# Patient Record
Sex: FEMALE | Race: OTHER | NOT HISPANIC OR LATINO | URBAN - METROPOLITAN AREA
[De-identification: names, ages, dates, MRNs, and addresses within clinical notes are randomized per-mention and may not be internally consistent; named-entity substitution may affect disease eponyms.]

---

## 2023-08-30 LAB
ERYTHROCYTE DISTRIBUTION WIDTH (RATIO) BY AUTOMATED COUNT: 13.7 % (ref 11.5–14.5)
ERYTHROCYTE MEAN CORPUSCULAR HEMOGLOBIN CONCENTRATION (G/DL) BY AUTOMATED: 33.1 G/DL (ref 32–36)
ERYTHROCYTE MEAN CORPUSCULAR VOLUME (FL) BY AUTOMATED COUNT: 94 FL (ref 80–100)
ERYTHROCYTES (10*6/UL) IN BLOOD BY AUTOMATED COUNT: 4.28 X10E12/L (ref 4–5.2)
HEMATOCRIT (%) IN BLOOD BY AUTOMATED COUNT: 40.2 % (ref 36–46)
HEMOGLOBIN (G/DL) IN BLOOD: 13.3 G/DL (ref 12–16)
LEUKOCYTES (10*3/UL) IN BLOOD BY AUTOMATED COUNT: 8.9 X10E9/L (ref 4.4–11.3)
PLATELETS (10*3/UL) IN BLOOD AUTOMATED COUNT: 224 X10E9/L (ref 150–450)
REFLEX ADDED, ANEMIA PANEL: NORMAL

## 2023-08-31 LAB
ABO GROUP (TYPE) IN BLOOD: NORMAL
ANTIBODY SCREEN: NORMAL
CHLAMYDIA TRACH., AMPLIFIED: NEGATIVE
HEPATITIS B VIRUS SURFACE AG PRESENCE IN SERUM: NONREACTIVE
HEPATITIS C VIRUS AB PRESENCE IN SERUM: NONREACTIVE
HIV 1/ 2 AG/AB SCREEN: NONREACTIVE
N. GONORRHEA, AMPLIFIED: NEGATIVE
RH FACTOR: NORMAL
RUBELLA VIRUS IGG AB: POSITIVE
SYPHILIS TOTAL AB: NONREACTIVE
URINE CULTURE: NO GROWTH

## 2023-10-05 ENCOUNTER — TELEPHONE (OUTPATIENT)
Dept: OBSTETRICS AND GYNECOLOGY | Facility: CLINIC | Age: 33
End: 2023-10-05
Payer: COMMERCIAL

## 2023-10-05 DIAGNOSIS — Z3A.15 15 WEEKS GESTATION OF PREGNANCY (HHS-HCC): Primary | ICD-10-CM

## 2023-10-05 NOTE — TELEPHONE ENCOUNTER
Pt.'s  called for his wife. He stated that they are trying to get their anatomy scan scheduled. MFM said said they did not have an order. Can you please give the patient a call back when to order is placed.

## 2023-10-06 NOTE — TELEPHONE ENCOUNTER
Spoke with the patient and let her know that the MAC order was placed in her chart and she could schedule at her convenience. Patient is aware and understands.

## 2023-10-21 RX ORDER — B-COMPLEX WITH VITAMIN C
TABLET ORAL
COMMUNITY

## 2023-10-23 DIAGNOSIS — Z3A.18 18 WEEKS GESTATION OF PREGNANCY (HHS-HCC): Primary | ICD-10-CM

## 2023-10-24 ENCOUNTER — ROUTINE PRENATAL (OUTPATIENT)
Dept: OBSTETRICS AND GYNECOLOGY | Facility: CLINIC | Age: 33
End: 2023-10-24
Payer: COMMERCIAL

## 2023-10-24 VITALS — SYSTOLIC BLOOD PRESSURE: 108 MMHG | DIASTOLIC BLOOD PRESSURE: 68 MMHG | WEIGHT: 125.13 LBS

## 2023-10-24 DIAGNOSIS — Z3A.18 18 WEEKS GESTATION OF PREGNANCY (HHS-HCC): Primary | ICD-10-CM

## 2023-10-24 PROCEDURE — 0501F PRENATAL FLOW SHEET: CPT | Performed by: OBSTETRICS & GYNECOLOGY

## 2023-11-01 ENCOUNTER — ANCILLARY PROCEDURE (OUTPATIENT)
Dept: RADIOLOGY | Facility: CLINIC | Age: 33
End: 2023-11-01
Payer: COMMERCIAL

## 2023-11-01 DIAGNOSIS — Z3A.15 15 WEEKS GESTATION OF PREGNANCY (HHS-HCC): ICD-10-CM

## 2023-11-01 PROCEDURE — 76805 OB US >/= 14 WKS SNGL FETUS: CPT | Performed by: OBSTETRICS & GYNECOLOGY

## 2023-11-01 PROCEDURE — 76805 OB US >/= 14 WKS SNGL FETUS: CPT

## 2023-11-15 ENCOUNTER — ANCILLARY PROCEDURE (OUTPATIENT)
Dept: RADIOLOGY | Facility: CLINIC | Age: 33
End: 2023-11-15
Payer: COMMERCIAL

## 2023-11-15 DIAGNOSIS — Z3A.18 18 WEEKS GESTATION OF PREGNANCY (HHS-HCC): ICD-10-CM

## 2023-11-15 PROCEDURE — 76816 OB US FOLLOW-UP PER FETUS: CPT | Performed by: OBSTETRICS & GYNECOLOGY

## 2023-11-15 PROCEDURE — 76816 OB US FOLLOW-UP PER FETUS: CPT

## 2023-11-20 ENCOUNTER — APPOINTMENT (OUTPATIENT)
Dept: OBSTETRICS AND GYNECOLOGY | Facility: CLINIC | Age: 33
End: 2023-11-20
Payer: COMMERCIAL

## 2023-11-27 ENCOUNTER — ROUTINE PRENATAL (OUTPATIENT)
Dept: OBSTETRICS AND GYNECOLOGY | Facility: CLINIC | Age: 33
End: 2023-11-27
Payer: COMMERCIAL

## 2023-11-27 VITALS — DIASTOLIC BLOOD PRESSURE: 68 MMHG | WEIGHT: 134.2 LBS | SYSTOLIC BLOOD PRESSURE: 114 MMHG

## 2023-11-27 DIAGNOSIS — Z34.92 PRENATAL CARE IN SECOND TRIMESTER (HHS-HCC): ICD-10-CM

## 2023-11-27 DIAGNOSIS — Z3A.23 23 WEEKS GESTATION OF PREGNANCY (HHS-HCC): ICD-10-CM

## 2023-11-27 DIAGNOSIS — Z34.02 ENCOUNTER FOR PRENATAL CARE IN SECOND TRIMESTER OF FIRST PREGNANCY (HHS-HCC): Primary | ICD-10-CM

## 2023-11-27 PROBLEM — Z34.90 PRENATAL CARE (HHS-HCC): Status: ACTIVE | Noted: 2023-11-27

## 2023-11-27 PROCEDURE — 0501F PRENATAL FLOW SHEET: CPT

## 2023-11-27 NOTE — PROGRESS NOTES
Assessment/Plan   32 y.o.  at 23w0d  - discussed results of anatomy US; all WNL and no malformations identified  - provided with GCT supplies, instructions provided  - provided with 28 week folder and contents explained  - Routine prenatal care    Follow up in 4 weeks for next prenatal visit. GCT/CBC next visit.     CHANDNI Lin    Subjective     Noemy Ceaj is a 32 y.o.  at 23w0d with a working estimated date of delivery of 3/25/2024, by Last Menstrual Period presenting for a routine prenatal visit. She is doing well, no concerns. She denies vaginal bleeding, leakage of fluid, contractions, or decreased fetal movement.    Pregnancy Problems (from 23 to present)       Problem Noted Resolved    Prenatal care 2023 by CHANDNI Lin No    Priority:  Medium      Overview Signed 2023 11:26 AM by CHANDNI Lin     Declined flu shot                  Objective   Weight: 60.9 kg (134 lb 3.2 oz)  TW.441 kg (14 lb 3.2 oz)   Expected Total Weight Gain: Could not be calculated   Pregravid BMI: Could not be calculated  Pregravid Weight: 54.4 kg (120 lb)   BP: 114/68  Fetal Heart Rate: 152 Fundal Height (cm): 20 cm

## 2023-12-18 ENCOUNTER — ROUTINE PRENATAL (OUTPATIENT)
Dept: OBSTETRICS AND GYNECOLOGY | Facility: CLINIC | Age: 33
End: 2023-12-18
Payer: COMMERCIAL

## 2023-12-18 VITALS — DIASTOLIC BLOOD PRESSURE: 54 MMHG | WEIGHT: 138 LBS | SYSTOLIC BLOOD PRESSURE: 110 MMHG

## 2023-12-18 DIAGNOSIS — Z3A.26 26 WEEKS GESTATION OF PREGNANCY (HHS-HCC): Primary | ICD-10-CM

## 2023-12-18 DIAGNOSIS — Z13.1 SCREENING FOR DIABETES MELLITUS (DM): ICD-10-CM

## 2023-12-18 LAB
ERYTHROCYTE [DISTWIDTH] IN BLOOD BY AUTOMATED COUNT: 13.5 % (ref 11.5–14.5)
GLUCOSE 1H P 50 G GLC PO SERPL-MCNC: 103 MG/DL
HCT VFR BLD AUTO: 36.8 % (ref 36–46)
HGB BLD-MCNC: 11.9 G/DL (ref 12–16)
MCH RBC QN AUTO: 32.8 PG (ref 26–34)
MCHC RBC AUTO-ENTMCNC: 32.3 G/DL (ref 32–36)
MCV RBC AUTO: 101 FL (ref 80–100)
NRBC BLD-RTO: 0 /100 WBCS (ref 0–0)
PLATELET # BLD AUTO: 241 X10*3/UL (ref 150–450)
RBC # BLD AUTO: 3.63 X10*6/UL (ref 4–5.2)
WBC # BLD AUTO: 12 X10*3/UL (ref 4.4–11.3)

## 2023-12-18 PROCEDURE — 36415 COLL VENOUS BLD VENIPUNCTURE: CPT

## 2023-12-18 PROCEDURE — 0501F PRENATAL FLOW SHEET: CPT | Performed by: OBSTETRICS & GYNECOLOGY

## 2023-12-18 PROCEDURE — 85027 COMPLETE CBC AUTOMATED: CPT

## 2023-12-18 PROCEDURE — 82947 ASSAY GLUCOSE BLOOD QUANT: CPT

## 2023-12-18 NOTE — PROGRESS NOTES
CBC GLU  labs today.  Doing well. No complaints.   Pregnancy questions answered. Breast pump order.     TDAP next visit    Lizeth Oro MD

## 2023-12-19 LAB — REFLEX ADDED, ANEMIA PANEL: NORMAL

## 2024-01-02 ENCOUNTER — ROUTINE PRENATAL (OUTPATIENT)
Dept: OBSTETRICS AND GYNECOLOGY | Facility: CLINIC | Age: 34
End: 2024-01-02
Payer: COMMERCIAL

## 2024-01-02 VITALS — WEIGHT: 143.5 LBS | DIASTOLIC BLOOD PRESSURE: 68 MMHG | SYSTOLIC BLOOD PRESSURE: 114 MMHG | BODY MASS INDEX: 25.42 KG/M2

## 2024-01-02 DIAGNOSIS — Z3A.28 28 WEEKS GESTATION OF PREGNANCY (HHS-HCC): Primary | ICD-10-CM

## 2024-01-02 DIAGNOSIS — Z23 ENCOUNTER FOR VACCINATION: ICD-10-CM

## 2024-01-02 PROCEDURE — 90715 TDAP VACCINE 7 YRS/> IM: CPT | Performed by: OBSTETRICS & GYNECOLOGY

## 2024-01-02 PROCEDURE — 0501F PRENATAL FLOW SHEET: CPT | Performed by: OBSTETRICS & GYNECOLOGY

## 2024-01-02 PROCEDURE — 90471 IMMUNIZATION ADMIN: CPT | Performed by: OBSTETRICS & GYNECOLOGY

## 2024-01-02 NOTE — PROGRESS NOTES
TDAP done today.  Declines Flu vaccine/  is an ER resident and has tried to convince her to get it. Pt declines, also declines Covid vaccine.    Doing well. No complaints.   CBC and GLU wnl.

## 2024-01-15 ENCOUNTER — ROUTINE PRENATAL (OUTPATIENT)
Dept: OBSTETRICS AND GYNECOLOGY | Facility: CLINIC | Age: 34
End: 2024-01-15
Payer: COMMERCIAL

## 2024-01-15 VITALS — SYSTOLIC BLOOD PRESSURE: 114 MMHG | DIASTOLIC BLOOD PRESSURE: 64 MMHG | WEIGHT: 146 LBS | BODY MASS INDEX: 25.86 KG/M2

## 2024-01-15 DIAGNOSIS — Z34.93 PRENATAL CARE IN THIRD TRIMESTER (HHS-HCC): ICD-10-CM

## 2024-01-15 DIAGNOSIS — Z3A.30 30 WEEKS GESTATION OF PREGNANCY (HHS-HCC): Primary | ICD-10-CM

## 2024-01-15 PROCEDURE — 0501F PRENATAL FLOW SHEET: CPT | Performed by: OBSTETRICS & GYNECOLOGY

## 2024-01-15 NOTE — PROGRESS NOTES
Doing well. No complaints.   Looking into holistic birth . Works as acupuncturist.  Discussed options at [facility]    Lizeth Oro MD

## 2024-01-29 ENCOUNTER — APPOINTMENT (OUTPATIENT)
Dept: OBSTETRICS AND GYNECOLOGY | Facility: CLINIC | Age: 34
End: 2024-01-29
Payer: COMMERCIAL

## 2024-01-30 ENCOUNTER — APPOINTMENT (OUTPATIENT)
Dept: OBSTETRICS AND GYNECOLOGY | Facility: CLINIC | Age: 34
End: 2024-01-30
Payer: COMMERCIAL

## 2024-01-31 ENCOUNTER — APPOINTMENT (OUTPATIENT)
Dept: OBSTETRICS AND GYNECOLOGY | Facility: CLINIC | Age: 34
End: 2024-01-31
Payer: COMMERCIAL

## 2024-02-09 ENCOUNTER — ROUTINE PRENATAL (OUTPATIENT)
Dept: OBSTETRICS AND GYNECOLOGY | Facility: CLINIC | Age: 34
End: 2024-02-09
Payer: COMMERCIAL

## 2024-02-09 VITALS
WEIGHT: 149.38 LBS | DIASTOLIC BLOOD PRESSURE: 62 MMHG | BODY MASS INDEX: 26.46 KG/M2 | SYSTOLIC BLOOD PRESSURE: 118 MMHG

## 2024-02-09 DIAGNOSIS — Z3A.33 33 WEEKS GESTATION OF PREGNANCY (HHS-HCC): Primary | ICD-10-CM

## 2024-02-09 DIAGNOSIS — Z34.93 PRENATAL CARE IN THIRD TRIMESTER (HHS-HCC): ICD-10-CM

## 2024-02-09 PROCEDURE — 0501F PRENATAL FLOW SHEET: CPT

## 2024-02-09 NOTE — PROGRESS NOTES
Patient presents today for OBFU.  Patient has questions concerning the position of baby.  Patient would like to know what aides are available to her during labor.    GABRIEL LOCKHART MA    Subjective     Noemy Ceja is a 33 y.o.  at 33w4d with a working estimated date of delivery of 3/25/2024, by Last Menstrual Period who presents for a routine prenatal visit. She denies vaginal bleeding, leakage of fluid, decreased fetal movements, or contractions. Patient reports overall feeling well.       Patient with multiple questions in regards to infant growth, position,  NCB, labor and birth expectations, , and breast feeding etc.     Encouraged and discussed with patient scheduling childbirth education and breast feeding classes. Suggested downloading hypnobirthing josé miguel to help manage contractions in labor.      Her pregnancy is complicated by:  Medical Problems       Problem List       Prenatal care    Overview Signed 2023 11:26 AM by CHANDNI Lin     Declined flu shot               Objective   Weight: 67.8 kg (149 lb 6 oz)  TW.3 kg (29 lb 6 oz)  Pregravid BMI: 21.26    BP: 118/62          Prenatal Labs:  Lab Results   Component Value Date    HGB 11.9 (L) 2023    HCT 36.8 2023     2023    ABO A 2023    LABRH POS 2023    NEISSGONOAMP NEGATIVE 2023    CHLAMTRACAMP NEGATIVE 2023    SYPHT NONREACTIVE 2023    HEPBSAG NONREACTIVE 2023    HIV1X2 NONREACTIVE 2023    URINECULTURE NO GROWTH 2023       Assessment/Plan   Reviewed measures for cervical ripening. Anticipatory guidance provided on pregnancy expectations.   Follow up in 2 week for a routine prenatal visit.    CHANDNI aSlas

## 2024-02-22 ENCOUNTER — ROUTINE PRENATAL (OUTPATIENT)
Dept: OBSTETRICS AND GYNECOLOGY | Facility: CLINIC | Age: 34
End: 2024-02-22
Payer: COMMERCIAL

## 2024-02-22 ENCOUNTER — APPOINTMENT (OUTPATIENT)
Dept: OBSTETRICS AND GYNECOLOGY | Facility: CLINIC | Age: 34
End: 2024-02-22
Payer: COMMERCIAL

## 2024-02-22 VITALS — DIASTOLIC BLOOD PRESSURE: 66 MMHG | WEIGHT: 153 LBS | BODY MASS INDEX: 27.1 KG/M2 | SYSTOLIC BLOOD PRESSURE: 120 MMHG

## 2024-02-22 DIAGNOSIS — Z3A.35 35 WEEKS GESTATION OF PREGNANCY (HHS-HCC): Primary | ICD-10-CM

## 2024-02-22 PROCEDURE — 0501F PRENATAL FLOW SHEET: CPT | Performed by: OBSTETRICS & GYNECOLOGY

## 2024-02-29 ENCOUNTER — ROUTINE PRENATAL (OUTPATIENT)
Dept: OBSTETRICS AND GYNECOLOGY | Facility: CLINIC | Age: 34
End: 2024-02-29
Payer: COMMERCIAL

## 2024-02-29 VITALS — SYSTOLIC BLOOD PRESSURE: 118 MMHG | BODY MASS INDEX: 27.19 KG/M2 | WEIGHT: 153.5 LBS | DIASTOLIC BLOOD PRESSURE: 66 MMHG

## 2024-02-29 DIAGNOSIS — Z3A.36 36 WEEKS GESTATION OF PREGNANCY (HHS-HCC): ICD-10-CM

## 2024-02-29 PROCEDURE — 0501F PRENATAL FLOW SHEET: CPT | Performed by: OBSTETRICS & GYNECOLOGY

## 2024-02-29 PROCEDURE — 87081 CULTURE SCREEN ONLY: CPT

## 2024-02-29 NOTE — PROGRESS NOTES
GBS done today.    Doing well. No complaints.     Plans for hosp stay and labor preferences reviewed: NCB, breast feeding    Lizeth Oro MD

## 2024-03-03 LAB — GP B STREP GENITAL QL CULT: ABNORMAL

## 2024-03-07 ENCOUNTER — ROUTINE PRENATAL (OUTPATIENT)
Dept: OBSTETRICS AND GYNECOLOGY | Facility: CLINIC | Age: 34
End: 2024-03-07
Payer: COMMERCIAL

## 2024-03-07 VITALS
BODY MASS INDEX: 27.66 KG/M2 | DIASTOLIC BLOOD PRESSURE: 60 MMHG | SYSTOLIC BLOOD PRESSURE: 116 MMHG | WEIGHT: 156.13 LBS

## 2024-03-07 DIAGNOSIS — Z3A.37 37 WEEKS GESTATION OF PREGNANCY (HHS-HCC): Primary | ICD-10-CM

## 2024-03-07 PROCEDURE — 0501F PRENATAL FLOW SHEET: CPT | Performed by: OBSTETRICS & GYNECOLOGY

## 2024-03-12 ENCOUNTER — ROUTINE PRENATAL (OUTPATIENT)
Dept: OBSTETRICS AND GYNECOLOGY | Facility: CLINIC | Age: 34
End: 2024-03-12
Payer: COMMERCIAL

## 2024-03-12 VITALS
WEIGHT: 156.13 LBS | BODY MASS INDEX: 27.66 KG/M2 | DIASTOLIC BLOOD PRESSURE: 62 MMHG | SYSTOLIC BLOOD PRESSURE: 110 MMHG

## 2024-03-12 DIAGNOSIS — Z3A.38 38 WEEKS GESTATION OF PREGNANCY (HHS-HCC): Primary | ICD-10-CM

## 2024-03-12 PROCEDURE — 0501F PRENATAL FLOW SHEET: CPT | Performed by: OBSTETRICS & GYNECOLOGY

## 2024-03-13 ENCOUNTER — APPOINTMENT (OUTPATIENT)
Dept: OBSTETRICS AND GYNECOLOGY | Facility: CLINIC | Age: 34
End: 2024-03-13
Payer: COMMERCIAL

## 2024-03-19 ENCOUNTER — HOSPITAL ENCOUNTER (INPATIENT)
Facility: HOSPITAL | Age: 34
LOS: 3 days | Discharge: HOME | End: 2024-03-22
Attending: OBSTETRICS & GYNECOLOGY | Admitting: OBSTETRICS & GYNECOLOGY
Payer: COMMERCIAL

## 2024-03-19 ENCOUNTER — ANESTHESIA EVENT (OUTPATIENT)
Dept: OBSTETRICS AND GYNECOLOGY | Facility: HOSPITAL | Age: 34
End: 2024-03-19
Payer: COMMERCIAL

## 2024-03-19 ENCOUNTER — ANESTHESIA (OUTPATIENT)
Dept: OBSTETRICS AND GYNECOLOGY | Facility: HOSPITAL | Age: 34
End: 2024-03-19
Payer: COMMERCIAL

## 2024-03-19 DIAGNOSIS — G89.18 POST-OP PAIN: Primary | ICD-10-CM

## 2024-03-19 PROBLEM — Z37.9 NORMAL LABOR (HHS-HCC): Status: ACTIVE | Noted: 2024-03-19

## 2024-03-19 LAB
ABO GROUP (TYPE) IN BLOOD: NORMAL
ABO GROUP (TYPE) IN BLOOD: NORMAL
ALBUMIN SERPL BCP-MCNC: 3.4 G/DL (ref 3.4–5)
ALP SERPL-CCNC: 144 U/L (ref 33–110)
ALT SERPL W P-5'-P-CCNC: 10 U/L (ref 7–45)
ANION GAP SERPL CALC-SCNC: 17 MMOL/L (ref 10–20)
ANTIBODY SCREEN: NORMAL
APTT PPP: 25 SECONDS (ref 27–38)
AST SERPL W P-5'-P-CCNC: 17 U/L (ref 9–39)
BILIRUB SERPL-MCNC: 0.4 MG/DL (ref 0–1.2)
BUN SERPL-MCNC: 14 MG/DL (ref 6–23)
CALCIUM SERPL-MCNC: 9 MG/DL (ref 8.6–10.3)
CHLORIDE SERPL-SCNC: 101 MMOL/L (ref 98–107)
CO2 SERPL-SCNC: 19 MMOL/L (ref 21–32)
CREAT SERPL-MCNC: 0.88 MG/DL (ref 0.5–1.05)
EGFRCR SERPLBLD CKD-EPI 2021: 90 ML/MIN/1.73M*2
ERYTHROCYTE [DISTWIDTH] IN BLOOD BY AUTOMATED COUNT: 15.2 % (ref 11.5–14.5)
ERYTHROCYTE [DISTWIDTH] IN BLOOD BY AUTOMATED COUNT: 15.3 % (ref 11.5–14.5)
FIBRINOGEN PPP-MCNC: 466 MG/DL (ref 200–400)
GLUCOSE SERPL-MCNC: 106 MG/DL (ref 74–99)
HCT VFR BLD AUTO: 37.3 % (ref 36–46)
HCT VFR BLD AUTO: 39.4 % (ref 36–46)
HGB BLD-MCNC: 12.5 G/DL (ref 12–16)
HGB BLD-MCNC: 13 G/DL (ref 12–16)
INR PPP: 0.9 (ref 0.9–1.1)
LDH SERPL L TO P-CCNC: 142 U/L (ref 84–246)
MCH RBC QN AUTO: 31 PG (ref 26–34)
MCH RBC QN AUTO: 31.1 PG (ref 26–34)
MCHC RBC AUTO-ENTMCNC: 33 G/DL (ref 32–36)
MCHC RBC AUTO-ENTMCNC: 33.5 G/DL (ref 32–36)
MCV RBC AUTO: 93 FL (ref 80–100)
MCV RBC AUTO: 94 FL (ref 80–100)
NRBC BLD-RTO: 0 /100 WBCS (ref 0–0)
NRBC BLD-RTO: 0 /100 WBCS (ref 0–0)
PLATELET # BLD AUTO: 210 X10*3/UL (ref 150–450)
PLATELET # BLD AUTO: 238 X10*3/UL (ref 150–450)
POTASSIUM SERPL-SCNC: 3.9 MMOL/L (ref 3.5–5.3)
PROT SERPL-MCNC: 6.1 G/DL (ref 6.4–8.2)
PROTHROMBIN TIME: 10.3 SECONDS (ref 9.8–12.8)
RBC # BLD AUTO: 4.02 X10*6/UL (ref 4–5.2)
RBC # BLD AUTO: 4.2 X10*6/UL (ref 4–5.2)
RH FACTOR (ANTIGEN D): NORMAL
RH FACTOR (ANTIGEN D): NORMAL
SODIUM SERPL-SCNC: 133 MMOL/L (ref 136–145)
TREPONEMA PALLIDUM IGG+IGM AB [PRESENCE] IN SERUM OR PLASMA BY IMMUNOASSAY: NONREACTIVE
URATE SERPL-MCNC: 5.5 MG/DL (ref 2.3–6.7)
WBC # BLD AUTO: 12.1 X10*3/UL (ref 4.4–11.3)
WBC # BLD AUTO: 21.4 X10*3/UL (ref 4.4–11.3)

## 2024-03-19 PROCEDURE — 84550 ASSAY OF BLOOD/URIC ACID: CPT | Performed by: MIDWIFE

## 2024-03-19 PROCEDURE — 2500000004 HC RX 250 GENERAL PHARMACY W/ HCPCS (ALT 636 FOR OP/ED): Performed by: OBSTETRICS & GYNECOLOGY

## 2024-03-19 PROCEDURE — 85384 FIBRINOGEN ACTIVITY: CPT | Performed by: MIDWIFE

## 2024-03-19 PROCEDURE — 85610 PROTHROMBIN TIME: CPT | Performed by: MIDWIFE

## 2024-03-19 PROCEDURE — 2500000005 HC RX 250 GENERAL PHARMACY W/O HCPCS: Performed by: NURSE ANESTHETIST, CERTIFIED REGISTERED

## 2024-03-19 PROCEDURE — 7210000002 HC LABOR PER HOUR

## 2024-03-19 PROCEDURE — 01967 NEURAXL LBR ANES VAG DLVR: CPT | Performed by: NURSE ANESTHETIST, CERTIFIED REGISTERED

## 2024-03-19 PROCEDURE — 59514 CESAREAN DELIVERY ONLY: CPT | Performed by: STUDENT IN AN ORGANIZED HEALTH CARE EDUCATION/TRAINING PROGRAM

## 2024-03-19 PROCEDURE — 2500000005 HC RX 250 GENERAL PHARMACY W/O HCPCS: Performed by: OBSTETRICS & GYNECOLOGY

## 2024-03-19 PROCEDURE — 01968 ANES/ANALG CS DLVR NEURAXIAL: CPT | Performed by: NURSE ANESTHETIST, CERTIFIED REGISTERED

## 2024-03-19 PROCEDURE — 83615 LACTATE (LD) (LDH) ENZYME: CPT | Performed by: MIDWIFE

## 2024-03-19 PROCEDURE — 2500000004 HC RX 250 GENERAL PHARMACY W/ HCPCS (ALT 636 FOR OP/ED): Performed by: NURSE ANESTHETIST, CERTIFIED REGISTERED

## 2024-03-19 PROCEDURE — 2500000001 HC RX 250 WO HCPCS SELF ADMINISTERED DRUGS (ALT 637 FOR MEDICARE OP): Performed by: NURSE ANESTHETIST, CERTIFIED REGISTERED

## 2024-03-19 PROCEDURE — 36415 COLL VENOUS BLD VENIPUNCTURE: CPT | Performed by: MIDWIFE

## 2024-03-19 PROCEDURE — 86920 COMPATIBILITY TEST SPIN: CPT

## 2024-03-19 PROCEDURE — 10907ZC DRAINAGE OF AMNIOTIC FLUID, THERAPEUTIC FROM PRODUCTS OF CONCEPTION, VIA NATURAL OR ARTIFICIAL OPENING: ICD-10-PCS | Performed by: STUDENT IN AN ORGANIZED HEALTH CARE EDUCATION/TRAINING PROGRAM

## 2024-03-19 PROCEDURE — 86850 RBC ANTIBODY SCREEN: CPT | Performed by: OBSTETRICS & GYNECOLOGY

## 2024-03-19 PROCEDURE — 86780 TREPONEMA PALLIDUM: CPT | Mod: STJLAB | Performed by: OBSTETRICS & GYNECOLOGY

## 2024-03-19 PROCEDURE — 3700000014 HC AN EPIDURAL BLOCK CHARGE: Performed by: STUDENT IN AN ORGANIZED HEALTH CARE EDUCATION/TRAINING PROGRAM

## 2024-03-19 PROCEDURE — 2500000004 HC RX 250 GENERAL PHARMACY W/ HCPCS (ALT 636 FOR OP/ED): Performed by: STUDENT IN AN ORGANIZED HEALTH CARE EDUCATION/TRAINING PROGRAM

## 2024-03-19 PROCEDURE — 88307 TISSUE EXAM BY PATHOLOGIST: CPT | Performed by: PATHOLOGY

## 2024-03-19 PROCEDURE — 36415 COLL VENOUS BLD VENIPUNCTURE: CPT | Performed by: OBSTETRICS & GYNECOLOGY

## 2024-03-19 PROCEDURE — 59050 FETAL MONITOR W/REPORT: CPT

## 2024-03-19 PROCEDURE — 80053 COMPREHEN METABOLIC PANEL: CPT | Performed by: MIDWIFE

## 2024-03-19 PROCEDURE — 59510 CESAREAN DELIVERY: CPT | Performed by: STUDENT IN AN ORGANIZED HEALTH CARE EDUCATION/TRAINING PROGRAM

## 2024-03-19 PROCEDURE — 1120000001 HC OB PRIVATE ROOM DAILY

## 2024-03-19 PROCEDURE — 7100000016 HC LABOR RECOVERY PER HOUR: Performed by: STUDENT IN AN ORGANIZED HEALTH CARE EDUCATION/TRAINING PROGRAM

## 2024-03-19 PROCEDURE — 88307 TISSUE EXAM BY PATHOLOGIST: CPT | Mod: TC,STJLAB | Performed by: STUDENT IN AN ORGANIZED HEALTH CARE EDUCATION/TRAINING PROGRAM

## 2024-03-19 PROCEDURE — 3700000018 HC OB ANESTHESIA C-SECTION: Performed by: STUDENT IN AN ORGANIZED HEALTH CARE EDUCATION/TRAINING PROGRAM

## 2024-03-19 PROCEDURE — 85027 COMPLETE CBC AUTOMATED: CPT | Performed by: MIDWIFE

## 2024-03-19 PROCEDURE — 85027 COMPLETE CBC AUTOMATED: CPT | Performed by: OBSTETRICS & GYNECOLOGY

## 2024-03-19 PROCEDURE — 51701 INSERT BLADDER CATHETER: CPT

## 2024-03-19 RX ORDER — OXYTOCIN 10 [USP'U]/ML
10 INJECTION, SOLUTION INTRAMUSCULAR; INTRAVENOUS ONCE AS NEEDED
Status: DISCONTINUED | OUTPATIENT
Start: 2024-03-19 | End: 2024-03-20

## 2024-03-19 RX ORDER — METHYLERGONOVINE MALEATE 0.2 MG/ML
0.2 INJECTION INTRAVENOUS ONCE AS NEEDED
Status: DISCONTINUED | OUTPATIENT
Start: 2024-03-19 | End: 2024-03-20

## 2024-03-19 RX ORDER — DIPHENHYDRAMINE HYDROCHLORIDE 50 MG/ML
25 INJECTION INTRAMUSCULAR; INTRAVENOUS EVERY 4 HOURS PRN
Status: DISCONTINUED | OUTPATIENT
Start: 2024-03-19 | End: 2024-03-22 | Stop reason: HOSPADM

## 2024-03-19 RX ORDER — LABETALOL HYDROCHLORIDE 5 MG/ML
20 INJECTION, SOLUTION INTRAVENOUS ONCE AS NEEDED
Status: DISCONTINUED | OUTPATIENT
Start: 2024-03-19 | End: 2024-03-20

## 2024-03-19 RX ORDER — NIFEDIPINE 10 MG/1
10 CAPSULE ORAL ONCE AS NEEDED
Status: DISCONTINUED | OUTPATIENT
Start: 2024-03-19 | End: 2024-03-22 | Stop reason: HOSPADM

## 2024-03-19 RX ORDER — FENTANYL/ROPIVACAINE/NS/PF 2MCG/ML-.2
0-25 PLASTIC BAG, INJECTION (ML) INJECTION CONTINUOUS
Status: DISCONTINUED | OUTPATIENT
Start: 2024-03-19 | End: 2024-03-20

## 2024-03-19 RX ORDER — CARBOPROST TROMETHAMINE 250 UG/ML
250 INJECTION, SOLUTION INTRAMUSCULAR ONCE AS NEEDED
Status: DISCONTINUED | OUTPATIENT
Start: 2024-03-19 | End: 2024-03-20

## 2024-03-19 RX ORDER — METOCLOPRAMIDE HYDROCHLORIDE 5 MG/ML
10 INJECTION INTRAMUSCULAR; INTRAVENOUS EVERY 6 HOURS PRN
Status: DISCONTINUED | OUTPATIENT
Start: 2024-03-19 | End: 2024-03-20

## 2024-03-19 RX ORDER — MISOPROSTOL 200 UG/1
800 TABLET ORAL ONCE AS NEEDED
Status: DISCONTINUED | OUTPATIENT
Start: 2024-03-19 | End: 2024-03-20

## 2024-03-19 RX ORDER — MAGNESIUM HYDROXIDE 2400 MG/10ML
10 SUSPENSION ORAL
Status: DISCONTINUED | OUTPATIENT
Start: 2024-03-19 | End: 2024-03-22 | Stop reason: HOSPADM

## 2024-03-19 RX ORDER — METOCLOPRAMIDE 10 MG/1
10 TABLET ORAL EVERY 6 HOURS PRN
Status: DISCONTINUED | OUTPATIENT
Start: 2024-03-19 | End: 2024-03-20

## 2024-03-19 RX ORDER — FENTANYL CITRATE 50 UG/ML
INJECTION, SOLUTION INTRAMUSCULAR; INTRAVENOUS AS NEEDED
Status: DISCONTINUED | OUTPATIENT
Start: 2024-03-19 | End: 2024-03-19

## 2024-03-19 RX ORDER — TERBUTALINE SULFATE 1 MG/ML
0.25 INJECTION SUBCUTANEOUS ONCE AS NEEDED
Status: DISCONTINUED | OUTPATIENT
Start: 2024-03-19 | End: 2024-03-20

## 2024-03-19 RX ORDER — LIDOCAINE 560 MG/1
1 PATCH PERCUTANEOUS; TOPICAL; TRANSDERMAL
Status: DISCONTINUED | OUTPATIENT
Start: 2024-03-19 | End: 2024-03-22 | Stop reason: HOSPADM

## 2024-03-19 RX ORDER — PHENYLEPHRINE HCL IN 0.9% NACL 1 MG/10 ML
SYRINGE (ML) INTRAVENOUS AS NEEDED
Status: DISCONTINUED | OUTPATIENT
Start: 2024-03-19 | End: 2024-03-19

## 2024-03-19 RX ORDER — MINERAL OIL
OIL (ML) ORAL
Status: DISPENSED
Start: 2024-03-19 | End: 2024-03-20

## 2024-03-19 RX ORDER — METHYLERGONOVINE MALEATE 0.2 MG/ML
0.2 INJECTION INTRAVENOUS ONCE AS NEEDED
Status: DISCONTINUED | OUTPATIENT
Start: 2024-03-19 | End: 2024-03-22 | Stop reason: HOSPADM

## 2024-03-19 RX ORDER — SCOLOPAMINE TRANSDERMAL SYSTEM 1 MG/1
PATCH, EXTENDED RELEASE TRANSDERMAL AS NEEDED
Status: DISCONTINUED | OUTPATIENT
Start: 2024-03-19 | End: 2024-03-19

## 2024-03-19 RX ORDER — ACETAMINOPHEN 325 MG/1
975 TABLET ORAL EVERY 6 HOURS SCHEDULED
Status: DISCONTINUED | OUTPATIENT
Start: 2024-03-20 | End: 2024-03-22 | Stop reason: HOSPADM

## 2024-03-19 RX ORDER — OXYCODONE HYDROCHLORIDE 10 MG/1
10 TABLET ORAL EVERY 4 HOURS PRN
Status: DISCONTINUED | OUTPATIENT
Start: 2024-03-20 | End: 2024-03-22 | Stop reason: HOSPADM

## 2024-03-19 RX ORDER — LIDOCAINE HYDROCHLORIDE 20 MG/ML
INJECTION, SOLUTION EPIDURAL; INFILTRATION; INTRACAUDAL; PERINEURAL AS NEEDED
Status: DISCONTINUED | OUTPATIENT
Start: 2024-03-19 | End: 2024-03-19

## 2024-03-19 RX ORDER — CARBOPROST TROMETHAMINE 250 UG/ML
250 INJECTION, SOLUTION INTRAMUSCULAR ONCE AS NEEDED
Status: DISCONTINUED | OUTPATIENT
Start: 2024-03-19 | End: 2024-03-22 | Stop reason: HOSPADM

## 2024-03-19 RX ORDER — NIFEDIPINE 10 MG/1
10 CAPSULE ORAL ONCE AS NEEDED
Status: DISCONTINUED | OUTPATIENT
Start: 2024-03-19 | End: 2024-03-20

## 2024-03-19 RX ORDER — FENTANYL/ROPIVACAINE/NS/PF 2MCG/ML-.2
PLASTIC BAG, INJECTION (ML) INJECTION
Status: COMPLETED
Start: 2024-03-19 | End: 2024-03-19

## 2024-03-19 RX ORDER — LABETALOL HYDROCHLORIDE 5 MG/ML
20 INJECTION, SOLUTION INTRAVENOUS ONCE AS NEEDED
Status: DISCONTINUED | OUTPATIENT
Start: 2024-03-19 | End: 2024-03-22 | Stop reason: HOSPADM

## 2024-03-19 RX ORDER — OXYCODONE HYDROCHLORIDE 5 MG/1
5 TABLET ORAL EVERY 4 HOURS PRN
Status: DISCONTINUED | OUTPATIENT
Start: 2024-03-20 | End: 2024-03-22 | Stop reason: HOSPADM

## 2024-03-19 RX ORDER — LOPERAMIDE HYDROCHLORIDE 2 MG/1
4 CAPSULE ORAL EVERY 2 HOUR PRN
Status: DISCONTINUED | OUTPATIENT
Start: 2024-03-19 | End: 2024-03-19 | Stop reason: SDUPTHER

## 2024-03-19 RX ORDER — OXYTOCIN 10 [USP'U]/ML
10 INJECTION, SOLUTION INTRAMUSCULAR; INTRAVENOUS ONCE AS NEEDED
Status: DISCONTINUED | OUTPATIENT
Start: 2024-03-19 | End: 2024-03-22 | Stop reason: HOSPADM

## 2024-03-19 RX ORDER — POLYETHYLENE GLYCOL 3350 17 G/17G
17 POWDER, FOR SOLUTION ORAL 2 TIMES DAILY PRN
Status: DISCONTINUED | OUTPATIENT
Start: 2024-03-19 | End: 2024-03-22 | Stop reason: HOSPADM

## 2024-03-19 RX ORDER — ENOXAPARIN SODIUM 100 MG/ML
40 INJECTION SUBCUTANEOUS EVERY 24 HOURS
Status: DISCONTINUED | OUTPATIENT
Start: 2024-03-20 | End: 2024-03-22 | Stop reason: HOSPADM

## 2024-03-19 RX ORDER — PENICILLIN G 3000000 [IU]/50ML
3 INJECTION, SOLUTION INTRAVENOUS EVERY 4 HOURS
Status: DISCONTINUED | OUTPATIENT
Start: 2024-03-19 | End: 2024-03-19

## 2024-03-19 RX ORDER — DIPHENHYDRAMINE HCL 25 MG
25 TABLET ORAL EVERY 4 HOURS PRN
Status: DISCONTINUED | OUTPATIENT
Start: 2024-03-19 | End: 2024-03-22 | Stop reason: HOSPADM

## 2024-03-19 RX ORDER — HYDROMORPHONE HYDROCHLORIDE 1 MG/ML
0.2 INJECTION, SOLUTION INTRAMUSCULAR; INTRAVENOUS; SUBCUTANEOUS EVERY 5 MIN PRN
Status: DISCONTINUED | OUTPATIENT
Start: 2024-03-19 | End: 2024-03-22 | Stop reason: HOSPADM

## 2024-03-19 RX ORDER — CEFAZOLIN SODIUM 2 G/100ML
2 INJECTION, SOLUTION INTRAVENOUS ONCE
Status: COMPLETED | OUTPATIENT
Start: 2024-03-19 | End: 2024-03-19

## 2024-03-19 RX ORDER — SODIUM CHLORIDE, SODIUM LACTATE, POTASSIUM CHLORIDE, CALCIUM CHLORIDE 600; 310; 30; 20 MG/100ML; MG/100ML; MG/100ML; MG/100ML
125 INJECTION, SOLUTION INTRAVENOUS CONTINUOUS
Status: DISCONTINUED | OUTPATIENT
Start: 2024-03-19 | End: 2024-03-20

## 2024-03-19 RX ORDER — NALOXONE HYDROCHLORIDE 0.4 MG/ML
0.1 INJECTION, SOLUTION INTRAMUSCULAR; INTRAVENOUS; SUBCUTANEOUS EVERY 5 MIN PRN
Status: DISCONTINUED | OUTPATIENT
Start: 2024-03-19 | End: 2024-03-22 | Stop reason: HOSPADM

## 2024-03-19 RX ORDER — TRANEXAMIC ACID 100 MG/ML
1000 INJECTION, SOLUTION INTRAVENOUS ONCE AS NEEDED
Status: DISCONTINUED | OUTPATIENT
Start: 2024-03-19 | End: 2024-03-22 | Stop reason: HOSPADM

## 2024-03-19 RX ORDER — HYDRALAZINE HYDROCHLORIDE 20 MG/ML
5 INJECTION INTRAMUSCULAR; INTRAVENOUS ONCE AS NEEDED
Status: DISCONTINUED | OUTPATIENT
Start: 2024-03-19 | End: 2024-03-22 | Stop reason: HOSPADM

## 2024-03-19 RX ORDER — OXYTOCIN/0.9 % SODIUM CHLORIDE 30/500 ML
60 PLASTIC BAG, INJECTION (ML) INTRAVENOUS
Status: DISCONTINUED | OUTPATIENT
Start: 2024-03-19 | End: 2024-03-22 | Stop reason: HOSPADM

## 2024-03-19 RX ORDER — TRANEXAMIC ACID 100 MG/ML
1000 INJECTION, SOLUTION INTRAVENOUS ONCE AS NEEDED
Status: COMPLETED | OUTPATIENT
Start: 2024-03-19 | End: 2024-03-19

## 2024-03-19 RX ORDER — ACETAMINOPHEN 120 MG/1
SUPPOSITORY RECTAL AS NEEDED
Status: DISCONTINUED | OUTPATIENT
Start: 2024-03-19 | End: 2024-03-19

## 2024-03-19 RX ORDER — MISOPROSTOL 200 UG/1
800 TABLET ORAL ONCE AS NEEDED
Status: DISCONTINUED | OUTPATIENT
Start: 2024-03-19 | End: 2024-03-22 | Stop reason: HOSPADM

## 2024-03-19 RX ORDER — KETOROLAC TROMETHAMINE 30 MG/ML
INJECTION, SOLUTION INTRAMUSCULAR; INTRAVENOUS AS NEEDED
Status: DISCONTINUED | OUTPATIENT
Start: 2024-03-19 | End: 2024-03-19

## 2024-03-19 RX ORDER — SIMETHICONE 80 MG
80 TABLET,CHEWABLE ORAL 4 TIMES DAILY PRN
Status: DISCONTINUED | OUTPATIENT
Start: 2024-03-19 | End: 2024-03-22 | Stop reason: HOSPADM

## 2024-03-19 RX ORDER — MORPHINE SULFATE 1 MG/ML
INJECTION, SOLUTION EPIDURAL; INTRATHECAL; INTRAVENOUS AS NEEDED
Status: DISCONTINUED | OUTPATIENT
Start: 2024-03-19 | End: 2024-03-19

## 2024-03-19 RX ORDER — OXYTOCIN/0.9 % SODIUM CHLORIDE 30/500 ML
60 PLASTIC BAG, INJECTION (ML) INTRAVENOUS ONCE AS NEEDED
Status: DISCONTINUED | OUTPATIENT
Start: 2024-03-19 | End: 2024-03-20

## 2024-03-19 RX ORDER — ONDANSETRON 4 MG/1
4 TABLET, FILM COATED ORAL EVERY 6 HOURS PRN
Status: DISCONTINUED | OUTPATIENT
Start: 2024-03-19 | End: 2024-03-22 | Stop reason: HOSPADM

## 2024-03-19 RX ORDER — ONDANSETRON HYDROCHLORIDE 2 MG/ML
4 INJECTION, SOLUTION INTRAVENOUS EVERY 6 HOURS PRN
Status: DISCONTINUED | OUTPATIENT
Start: 2024-03-19 | End: 2024-03-22 | Stop reason: HOSPADM

## 2024-03-19 RX ORDER — KETOROLAC TROMETHAMINE 30 MG/ML
30 INJECTION, SOLUTION INTRAMUSCULAR; INTRAVENOUS EVERY 6 HOURS
Status: COMPLETED | OUTPATIENT
Start: 2024-03-19 | End: 2024-03-20

## 2024-03-19 RX ORDER — LOPERAMIDE HYDROCHLORIDE 2 MG/1
4 CAPSULE ORAL EVERY 2 HOUR PRN
Status: DISCONTINUED | OUTPATIENT
Start: 2024-03-19 | End: 2024-03-22 | Stop reason: HOSPADM

## 2024-03-19 RX ORDER — ONDANSETRON HYDROCHLORIDE 2 MG/ML
4 INJECTION, SOLUTION INTRAVENOUS EVERY 6 HOURS PRN
Status: DISCONTINUED | OUTPATIENT
Start: 2024-03-19 | End: 2024-03-20

## 2024-03-19 RX ORDER — LIDOCAINE HYDROCHLORIDE 10 MG/ML
30 INJECTION INFILTRATION; PERINEURAL ONCE AS NEEDED
Status: DISCONTINUED | OUTPATIENT
Start: 2024-03-19 | End: 2024-03-20

## 2024-03-19 RX ORDER — IBUPROFEN 600 MG/1
600 TABLET ORAL EVERY 6 HOURS
Status: DISCONTINUED | OUTPATIENT
Start: 2024-03-20 | End: 2024-03-22 | Stop reason: HOSPADM

## 2024-03-19 RX ORDER — ONDANSETRON 4 MG/1
4 TABLET, FILM COATED ORAL EVERY 6 HOURS PRN
Status: DISCONTINUED | OUTPATIENT
Start: 2024-03-19 | End: 2024-03-20

## 2024-03-19 RX ORDER — BISACODYL 10 MG/1
10 SUPPOSITORY RECTAL DAILY PRN
Status: DISCONTINUED | OUTPATIENT
Start: 2024-03-19 | End: 2024-03-22 | Stop reason: HOSPADM

## 2024-03-19 RX ORDER — HYDRALAZINE HYDROCHLORIDE 20 MG/ML
5 INJECTION INTRAMUSCULAR; INTRAVENOUS ONCE AS NEEDED
Status: DISCONTINUED | OUTPATIENT
Start: 2024-03-19 | End: 2024-03-20

## 2024-03-19 RX ADMIN — SODIUM CHLORIDE 5 MILLION UNITS: 900 INJECTION INTRAVENOUS at 05:47

## 2024-03-19 RX ADMIN — Medication 8 ML/HR: at 18:08

## 2024-03-19 RX ADMIN — SODIUM CHLORIDE, POTASSIUM CHLORIDE, SODIUM LACTATE AND CALCIUM CHLORIDE 125 ML/HR: 600; 310; 30; 20 INJECTION, SOLUTION INTRAVENOUS at 05:47

## 2024-03-19 RX ADMIN — PENICILLIN G 3 MILLION UNITS: 3000000 INJECTION, SOLUTION INTRAVENOUS at 08:51

## 2024-03-19 RX ADMIN — SODIUM CHLORIDE, POTASSIUM CHLORIDE, SODIUM LACTATE AND CALCIUM CHLORIDE: 600; 310; 30; 20 INJECTION, SOLUTION INTRAVENOUS at 19:07

## 2024-03-19 RX ADMIN — Medication 100 MCG: at 20:20

## 2024-03-19 RX ADMIN — LIDOCAINE HYDROCHLORIDE 60 MG: 20 INJECTION, SOLUTION EPIDURAL; INFILTRATION; INTRACAUDAL; PERINEURAL at 18:08

## 2024-03-19 RX ADMIN — SCOPOLAMINE 1 PATCH: 1.5 PATCH, EXTENDED RELEASE TRANSDERMAL at 19:51

## 2024-03-19 RX ADMIN — LIDOCAINE HYDROCHLORIDE 40 MG: 20 INJECTION, SOLUTION EPIDURAL; INFILTRATION; INTRACAUDAL; PERINEURAL at 19:33

## 2024-03-19 RX ADMIN — TRANEXAMIC ACID 1000 MG: 100 INJECTION INTRAVENOUS at 19:54

## 2024-03-19 RX ADMIN — DEXAMETHASONE SODIUM PHOSPHATE 4 MG: 4 INJECTION, SOLUTION INTRAMUSCULAR; INTRAVENOUS at 19:50

## 2024-03-19 RX ADMIN — FENTANYL CITRATE 100 MCG: 50 INJECTION, SOLUTION INTRAMUSCULAR; INTRAVENOUS at 19:18

## 2024-03-19 RX ADMIN — LIDOCAINE HYDROCHLORIDE 100 MG: 20 INJECTION, SOLUTION EPIDURAL; INFILTRATION; INTRACAUDAL; PERINEURAL at 19:01

## 2024-03-19 RX ADMIN — PENICILLIN G 3 MILLION UNITS: 3000000 INJECTION, SOLUTION INTRAVENOUS at 12:16

## 2024-03-19 RX ADMIN — MORPHINE SULFATE 2 MG: 1 INJECTION, SOLUTION EPIDURAL; INTRATHECAL; INTRAVENOUS at 20:18

## 2024-03-19 RX ADMIN — PENICILLIN G 3 MILLION UNITS: 3000000 INJECTION, SOLUTION INTRAVENOUS at 16:45

## 2024-03-19 RX ADMIN — SODIUM CHLORIDE, POTASSIUM CHLORIDE, SODIUM LACTATE AND CALCIUM CHLORIDE 125 ML/HR: 600; 310; 30; 20 INJECTION, SOLUTION INTRAVENOUS at 16:13

## 2024-03-19 RX ADMIN — ACETAMINOPHEN 650 MG: 120 SUPPOSITORY RECTAL at 20:42

## 2024-03-19 RX ADMIN — SODIUM CHLORIDE, POTASSIUM CHLORIDE, SODIUM LACTATE AND CALCIUM CHLORIDE 500 ML: 600; 310; 30; 20 INJECTION, SOLUTION INTRAVENOUS at 15:33

## 2024-03-19 RX ADMIN — ONDANSETRON 4 MG: 2 INJECTION, SOLUTION INTRAMUSCULAR; INTRAVENOUS at 19:50

## 2024-03-19 RX ADMIN — DEXTROSE MONOHYDRATE 500 MG: 50 INJECTION, SOLUTION INTRAVENOUS at 19:27

## 2024-03-19 RX ADMIN — KETOROLAC TROMETHAMINE 30 MG: 30 INJECTION, SOLUTION INTRAMUSCULAR; INTRAVENOUS at 20:17

## 2024-03-19 RX ADMIN — OXYTOCIN 600 MILLI-UNITS/MIN: 10 INJECTION, SOLUTION INTRAMUSCULAR; INTRAVENOUS at 19:45

## 2024-03-19 RX ADMIN — CEFAZOLIN SODIUM 2 G: 2 INJECTION, SOLUTION INTRAVENOUS at 19:09

## 2024-03-19 RX ADMIN — LIDOCAINE HYDROCHLORIDE 40 MG: 20 INJECTION, SOLUTION EPIDURAL; INFILTRATION; INTRACAUDAL; PERINEURAL at 18:01

## 2024-03-19 SDOH — SOCIAL STABILITY: SOCIAL INSECURITY: HAVE YOU HAD THOUGHTS OF HARMING ANYONE ELSE?: NO

## 2024-03-19 SDOH — HEALTH STABILITY: MENTAL HEALTH: CURRENT SMOKER: 0

## 2024-03-19 SDOH — HEALTH STABILITY: MENTAL HEALTH: WERE YOU ABLE TO COMPLETE ALL THE BEHAVIORAL HEALTH SCREENINGS?: YES

## 2024-03-19 SDOH — SOCIAL STABILITY: SOCIAL INSECURITY: ARE THERE ANY APPARENT SIGNS OF INJURIES/BEHAVIORS THAT COULD BE RELATED TO ABUSE/NEGLECT?: NO

## 2024-03-19 SDOH — ECONOMIC STABILITY: HOUSING INSECURITY: DO YOU FEEL UNSAFE GOING BACK TO THE PLACE WHERE YOU ARE LIVING?: NO

## 2024-03-19 SDOH — HEALTH STABILITY: MENTAL HEALTH: SUICIDAL BEHAVIOR (LIFETIME): NO

## 2024-03-19 SDOH — SOCIAL STABILITY: SOCIAL INSECURITY: PHYSICAL ABUSE: DENIES

## 2024-03-19 SDOH — SOCIAL STABILITY: SOCIAL INSECURITY: VERBAL ABUSE: DENIES

## 2024-03-19 SDOH — HEALTH STABILITY: MENTAL HEALTH: WISH TO BE DEAD (PAST 1 MONTH): NO

## 2024-03-19 SDOH — SOCIAL STABILITY: SOCIAL INSECURITY: ARE YOU OR HAVE YOU BEEN THREATENED OR ABUSED PHYSICALLY, EMOTIONALLY, OR SEXUALLY BY ANYONE?: NO

## 2024-03-19 SDOH — SOCIAL STABILITY: SOCIAL INSECURITY: DOES ANYONE TRY TO KEEP YOU FROM HAVING/CONTACTING OTHER FRIENDS OR DOING THINGS OUTSIDE YOUR HOME?: NO

## 2024-03-19 SDOH — SOCIAL STABILITY: SOCIAL INSECURITY: DO YOU FEEL ANYONE HAS EXPLOITED OR TAKEN ADVANTAGE OF YOU FINANCIALLY OR OF YOUR PERSONAL PROPERTY?: NO

## 2024-03-19 SDOH — HEALTH STABILITY: MENTAL HEALTH: NON-SPECIFIC ACTIVE SUICIDAL THOUGHTS (PAST 1 MONTH): NO

## 2024-03-19 SDOH — SOCIAL STABILITY: SOCIAL INSECURITY: HAS ANYONE EVER THREATENED TO HURT YOUR FAMILY OR YOUR PETS?: NO

## 2024-03-19 SDOH — SOCIAL STABILITY: SOCIAL INSECURITY: ABUSE SCREEN: ADULT

## 2024-03-19 ASSESSMENT — PAIN DESCRIPTION - DESCRIPTORS: DESCRIPTORS: SORE

## 2024-03-19 ASSESSMENT — LIFESTYLE VARIABLES
AUDIT-C TOTAL SCORE: 0
SKIP TO QUESTIONS 9-10: 1
HOW OFTEN DO YOU HAVE A DRINK CONTAINING ALCOHOL: NEVER
HOW MANY STANDARD DRINKS CONTAINING ALCOHOL DO YOU HAVE ON A TYPICAL DAY: PATIENT DOES NOT DRINK
HOW OFTEN DO YOU HAVE 6 OR MORE DRINKS ON ONE OCCASION: NEVER
AUDIT-C TOTAL SCORE: 0

## 2024-03-19 ASSESSMENT — ACTIVITIES OF DAILY LIVING (ADL)
GROOMING: INDEPENDENT
DRESSING YOURSELF: INDEPENDENT
ADEQUATE_TO_COMPLETE_ADL: YES
HEARING - LEFT EAR: FUNCTIONAL
LACK_OF_TRANSPORTATION: NO
BATHING: INDEPENDENT
JUDGMENT_ADEQUATE_SAFELY_COMPLETE_DAILY_ACTIVITIES: YES
WALKS IN HOME: INDEPENDENT
TOILETING: INDEPENDENT
FEEDING YOURSELF: INDEPENDENT
HEARING - RIGHT EAR: FUNCTIONAL
PATIENT'S MEMORY ADEQUATE TO SAFELY COMPLETE DAILY ACTIVITIES?: YES

## 2024-03-19 ASSESSMENT — PAIN SCALES - GENERAL
PAINLEVEL_OUTOF10: 0 - NO PAIN
PAINLEVEL_OUTOF10: 4
PAINLEVEL_OUTOF10: 3
PAINLEVEL_OUTOF10: 8
PAINLEVEL_OUTOF10: 5 - MODERATE PAIN
PAINLEVEL_OUTOF10: 7
PAINLEVEL_OUTOF10: 10 - WORST POSSIBLE PAIN
PAINLEVEL_OUTOF10: 5 - MODERATE PAIN
PAINLEVEL_OUTOF10: 4
PAINLEVEL_OUTOF10: 0 - NO PAIN
PAINLEVEL_OUTOF10: 3
PAINLEVEL_OUTOF10: 4
PAINLEVEL_OUTOF10: 4
PAINLEVEL_OUTOF10: 10 - WORST POSSIBLE PAIN
PAINLEVEL_OUTOF10: 4
PAINLEVEL_OUTOF10: 6
PAINLEVEL_OUTOF10: 7
PAINLEVEL_OUTOF10: 3
PAINLEVEL_OUTOF10: 8
PAINLEVEL_OUTOF10: 5 - MODERATE PAIN
PAINLEVEL_OUTOF10: 4
PAINLEVEL_OUTOF10: 8
PAINLEVEL_OUTOF10: 4
PAINLEVEL_OUTOF10: 5 - MODERATE PAIN
PAINLEVEL_OUTOF10: 7
PAINLEVEL_OUTOF10: 6

## 2024-03-19 ASSESSMENT — PATIENT HEALTH QUESTIONNAIRE - PHQ9
SUM OF ALL RESPONSES TO PHQ9 QUESTIONS 1 & 2: 0
2. FEELING DOWN, DEPRESSED OR HOPELESS: NOT AT ALL
1. LITTLE INTEREST OR PLEASURE IN DOING THINGS: NOT AT ALL

## 2024-03-19 NOTE — PROGRESS NOTES
S: Sitting on birthing ball, breathing through contractions. Support at bedside. Requests a cervical exam at this time.    O: SVE: 8/100/-1      Spontaneously ruptured for clear fluid upon cervical exam      Agata q2-4 per palpation      FHR reassured @136 per IFM with SROM    A: 32yo G1 at 39.1 per LMP c/w 19 week US      GBS+      Active labor      Normotensive    P: Encouraged maternal positioning for labor/comfort      Peanut ball       Discussed breathing, relaxation      Reassurance provided      Room prepared for delivery      Questions answered      Tub filled per patient request. Discussed strict rules for laboring in tub only. Patient notified she needs to alert staff the moment she feels rectal pressure/urge to push. Patient and partner verbalize agreement      Anticipate

## 2024-03-19 NOTE — HOSPITAL COURSE
History of Present Illness  Noemy Ceja is a 33 y.o.  at 39w1d. JENNIE: 3/25/2024, by Last Menstrual Period. Estimated fetal weight: 7-0. She has had prenatal care with Dr. Oro .     Chief Complaint: Contractions    0511: Admit, /-2, BBOW  0547: PCN #1  1122: 8/100/-1, SROM, clear  1354: 9/100/-1

## 2024-03-19 NOTE — H&P
Obstetrical Admission History and Physical     Noemy Ceja is a 33 y.o.  at 39w1d. JENNIE: 3/25/2024, by Last Menstrual Period. Estimated fetal weight: 7-0. She has had prenatal care with Dr. Oro .    Chief Complaint: Contractions    Assessment/Plan    Active Labor - desires HBC - Will start PCN for GBS    Principal Problem:    Normal labor      Pregnancy Problems (from 23 to present)       Problem Noted Resolved    Prenatal care 2023 by CHANDNI Lin No    Priority:  Medium      Overview Signed 2023 11:26 AM by CHANDNI Lin     Declined flu shot               Options for delivery have been discussed with the patient and she elects a vaginal delivery.  Labor has been discussed in detail. The risks, benefits, complications, alternatives, expected outcomes, potential problems during recuperation and recovery, and the risks of not performing the procedure were discussed with the patient. The patient stated understanding that the risks of delivery include, but are not limited to: death; reaction to medications; injury to bowel, bladder, ureters, uterus, cervix, vagina, and other pelvic and abdominal structures, infection; blood loss and possible need for transfusion; and potential need for surgery, including hysterectomy. The risks of injury to the infant during delivery were also discussed. All questions were answered. There was concurrence with the planned procedure, and the patient wanted to proceed.    Admit to inpatient status. I anticipate that this patient will require a stay exceeding at least 2 midnights for delivery and postpartum care.  Active management of labor.  Management of pregnancy complications, as indicated.    Subjective   Noemy is here complaining of q5 min contractions. Good fetal movement. C/O bloody show., Denies leaking of fluid.          Obstetrical History   OB History    Para Term  AB Living   1 0 0 0 0 0   SAB IAB Ectopic  "Multiple Live Births   0 0 0 0 0      # Outcome Date GA Lbr Emmanuel/2nd Weight Sex Delivery Anes PTL Lv   1 Current                Past Medical History  History reviewed. No pertinent past medical history.     Past Surgical History   History reviewed. No pertinent surgical history.    Social History  Social History     Tobacco Use    Smoking status: Never    Smokeless tobacco: Never   Substance Use Topics    Alcohol use: Not Currently     Substance and Sexual Activity   Drug Use Never       Allergies  Patient has no known allergies.     Medications  Medications Prior to Admission   Medication Sig Dispense Refill Last Dose    omega-3/dha/epa/fish oil (OMEGA-3 FISH OIL ORAL) Take by mouth.       prenatal vitamin, iron-folic, (Prenatal Vitamin) 27 mg iron-800 mcg folic acid tablet           Objective    Last Vitals  Temp Pulse Resp BP MAP O2 Sat   36.9 °C (98.4 °F) 81 18 125/75   96 %     Physical Examination  GENERAL: Examination reveals a well developed, well nourished, gravid female in no acute distress. She is alert and cooperative.  LUNGS:  Normal effort  HEART:  Normal rate  FHR is  , with Accelerations, and a Category I tracing.    VAGINA: normal appearing vagina with normal color and discharge and no lesions noted  CERVIX: 4 cm dilated, 90 % effaced, -2 station; MEMBRANES are  (Bulging Bag)  EXTREMITIES: no redness or tenderness in the calves or thighs, no edema  SKIN: normal coloration and turgor, no rashes  NEUROLOGICAL: alert, oriented, normal speech, no focal findings or movement disorder noted  PSYCHOLOGICAL: awake and alert; oriented to person, place, and time    Lab Review  No results found for: \"WBC\", \"HGB\", \"HCT\", \"PLT\"    "

## 2024-03-19 NOTE — PROGRESS NOTES
Patient now comfortable s/p epidural. Encouraged to start pushing again. Fatigued and requesting time to rest. Discussed concern for protracted second stage (now complete x3.5 hours though has only been pushed for a total of 2.5 hours). Agreeable to resume pushing after additional five minutes of rest. Will reassess fetal position and pushing efforts then with low threshold for OVD vs CD. Fetal status reassuring with category 1 tracing. Ctx q2 min spontaneously. HELLP labs sent for mild range BPs during epidural. CBC notable for WBC 21.4. Was tachycardic while pushing NCB and during epidural but now HR in the low 100s. Not currently meeting criteria for sepsis. Coags and fibrinogen also sent given bleeding at last exam felt to be somewhat greater than bloody show. Still pending at this time. T&C x1U pRBCs. To reassess in 5 minutes    Lisbeth Bush MD

## 2024-03-19 NOTE — PROGRESS NOTES
To bedside for 7 minute prolonged decel to the 90s at start of second stage. Per CNM, currently 0 station. Repositioned and encouraged to breathe through contractions with recovery back to baseline with moderate variability. Tracing remained category 1 and patient was allowed to resume pushing efforts. Tracing continues to remain category 1. CRNA also to bedside to discuss options for regional analgesia. Discussed indications for  including fetal intolerance of pushing as well as possible need for GETA in the setting of urgent . As tracing has since recovered, no indication for  at this time. Patient declines epidural at this time. To continue pushing efforts with plan for CEFM for duration of labor.     Lisbeth Bush MD

## 2024-03-19 NOTE — ANESTHESIA PREPROCEDURE EVALUATION
"Patient: Noemy Ceja    Evaluation Method: In-person visit    Procedure Information    Date: 24  Procedure: Labor Analgesia        39w1d 33 y.o.  attempted NCB but would like and epidural.    /72   Pulse (!) 113   Temp 37.2 °C (99 °F) (Oral)   Resp 20   Ht 1.588 m (5' 2.5\")   Wt 71.6 kg (157 lb 15.4 oz)   LMP 2023   SpO2 96%   BMI 28.43 kg/m²       Relevant Problems   No relevant active problems       Clinical information reviewed:   Tobacco  Allergies    Med Hx  Surg Hx   Fam Hx          NPO Detail:  No data recorded     OB/Gyn Evaluation    Present Pregnancy    Patient is pregnant now.   Obstetric History                Physical Exam    Airway  Mallampati: IV  TM distance: >3 FB  Neck ROM: full     Cardiovascular   Rhythm: regular  Rate: normal     Dental    Pulmonary   Breath sounds clear to auscultation     Abdominal     Comments: gravid           Anesthesia Plan    History of general anesthesia?: yes  History of complications of general anesthesia?: no    ASA 2     epidural   (Patient has no history of problems with anesthesia  Patient has no family history of problems with anesthesia     I informed and discussed the risks and benefits of general, spinal and epidural anesthesia with the patient.  The patient expressed her understanding and her questions were answered.  A verbal consent was given by the patient.  )  The patient is not a current smoker.    Anesthetic plan and risks discussed with patient.  Use of blood products discussed with patient who consented to blood products.        "

## 2024-03-19 NOTE — PROGRESS NOTES
Care assumed for NCB in room 7 and physician has another labor patient.    S: Breathing through contractions,  supportive at bedside. Taking breakfast without difficulties. Denies rectal/vaginal pain/pressure. Denies LOF.    O: SVE: Deferred      [place]: Agata q2-4 per palpation and patient report, as they are self timing      FHR: Reassuring per intermittent dopple @145    A: 32yo G1 at 39.1 per LMP c/w 19 week       GBS+      Latent labor      Normotensive    P: Reviewed labs/vitals      Discussed with patient position changes, hydrotherapy, AROM. Support provided and patient very pleased with and very pleased with and agreeable to midwifery management at this time.     IFM     GBS prophylaxis per guidelines --> Second dose due 0947     Plan at this time is for patient to finish her breakfast and mentally prepare for the day per her request. After 1-2 hour, will work very hands-on with patient with position changes, shower. Will reassess cervix for change when appropriate per patient request or per need for change in POC and once adequate GBS Prophylaxis assured      Questions answered, patient verbalizes understanding and agrees with POC      Dr. Bush aware of patient status and POC       Anticipate

## 2024-03-19 NOTE — NURSING NOTE
1235 Patient expressed feeling desire to bear down. AUBREY Brooks and RN present. RN continuously at bedside.   1354 Cervical exam by provider at this time, patient repositioned into throne.   1412 Rn at bedside assessing vitals, patient contacting and rocking with arms bent. Assisted with not being in motion during blood pressure check. Blood pressures discussed with AUBREY Brooks, no new orders at this time.   1433 RN and AUBREY Brooks, BP rechecked. See flow sheet. No new orders at this time.

## 2024-03-19 NOTE — NURSING NOTE
Per MD Colon patient okay to come off monitor. Continue intermittent monitoring. Patient to get IV at 0520 to get PCN prophylaxis for GBS

## 2024-03-19 NOTE — PROGRESS NOTES
Patient now agreeable to resume pushing. ARPIT on exam with vertex at +2 and significant caput. EFW 7 lb by [place]. Minimal descent with first push and then proceeded to have decel to 80s x2 minutes. Recovered to baseline with moderate variability with position changes. At this point, recommend either OVD or pLTCS for arrest of descent. Discussed r/b/a to each at length. Reviewed  risk of OVD including scalp edema/laceration as well as rare risk of life threatening brain bleed. Also discussed possibility of pulling into a shoulder dystocia and the risks associated with shoulder dystocia. Reviewed maternal risks of OVD including OASIS. Finally, reviewed recommendation for c/s should VAVD fail. Also discussed risks of  including but not limited to bleeding, infection, and damage to surrounding organs. After time to discuss, patient and partner elect to proceed with pLTCS. Ancef/azithro on call to OR. Already crossed for 1U pRBCs. Will avoid methergine given mild range BPs. All questions answered, agreeable to proceed    Lisbeth Bush MD

## 2024-03-19 NOTE — CARE PLAN
The patient's goals for the shift include Follow birth plan    The clinical goals for the shift include Reassuring FHR      Problem: Vaginal Birth or  Section  Goal: Fetal and maternal status remain reassuring during the birth process  Outcome: Progressing  Flowsheets (Taken 3/19/2024 0704)  Fetal and maternal status remain reassuring during the birth process:   Monitor vital signs   Monitor fetal heart rate   Monitor uterine activity  Goal: Demonstrates labor coping techniques through delivery  Outcome: Progressing  Flowsheets (Taken 3/19/2024 0704)  Demonstrates labor coping techniques through delivery:   Positioning, turning, and/or use of birthing ball assistance   Breathing/relaxation assistance  Goal: Minimal s/sx of HDP and BP<160/110  Outcome: Progressing  Flowsheets (Taken 3/19/2024 0704)  Minimal s/sx of HDP and BP <160/110:   Med administration/monitoring of effect   Monitor QBL and vital signs     Problem: Safety - Adult  Goal: Free from fall injury  Outcome: Progressing  Flowsheets (Taken 3/19/2024 0704)  Free from fall injury: Instruct family/caregiver on patient safety

## 2024-03-19 NOTE — PROGRESS NOTES
Continued position changes.  Encouraged to breathe through contraction and allow for passive descent.  Pitocin augmentation discussed, offered to help with the final stages of labor and to bring contraction closer than 4min. Patient would like to consider.

## 2024-03-19 NOTE — PROGRESS NOTES
Patient reports feeling urge and pressure with contractions.    SVE: 9/100/-2  Repositioned to high fowlers with foot of bed dropped  Discussed breathing techniques, support given  Family at bedside

## 2024-03-19 NOTE — PROGRESS NOTES
Patient reported feeling increase rectal urge with contractions and reflexive pushing. Strongly requests to push.0    SVE: 10/100/0    Pushing explained and patient coached. With second effective push, 205cc blood loss noted on pad and 7min long FHR deceleration noted. Pilar Winchester CRNA to bedside for evaluation.    Deceleration resolved with position changes and holding pushes for 5+ min.     Pushing resumed with excellent maternal efforts.    Anticipate

## 2024-03-19 NOTE — PROGRESS NOTES
Complete x2.5 hours, pushing x2 hours. Started at 0 station, now +1 with caput to +2. Difficult to assess fetal position given caput and patient discomfort with exam. Patient exhausted - pushing less effectively now than previously with no change in fetal station for the past 45 minutes. Tracing largely category 1 though did have 2 min decel to 80s that resolved with position change to hands and knees. Otherwise reassuring with moderate variability - overall low suspicion for acidemia at this time. Discussed lack of progress and options to either continue pushing NCB with pitocin augmentation, get an epidural, or pLTCS. Did also discuss OVD for maternal exhaustion if she is able to achieve +2 station. Desires epidural now. Will reassess fetal position and pushing efforts after epidural placement.    Lisbeth Bush MD

## 2024-03-19 NOTE — ANESTHESIA PROCEDURE NOTES
Epidural Block    Patient location during procedure: OB  Start time: 3/19/2024 5:29 PM  Reason for block: labor analgesia  Staffing  Performed: DANIE   Authorized by: GEORGE Hensley    Performed by: GEORGE Hensley    Preanesthetic Checklist  Completed: patient identified, IV checked, risks and benefits discussed, surgical consent, monitors and equipment checked, pre-op evaluation, timeout performed and sterile techniques followed  Block Timeout  RN/Licensed healthcare professional reads aloud to the Anesthesia provider and entire team: Patient identity, procedure with side and site, patient position, and as applicable the availability of implants/special equipment/special requirements.  Patient on coagulant treatment: no  Timeout performed at: 3/19/2024 5:32 PM  Block Placement  Patient position: sitting  Prep: DuraPrep  Sterility prep: hand, mask, cap, gloves and drape  Sedation level: no sedation  Patient monitoring: blood pressure, continuous pulse oximetry and heart rate  Approach: midline  Local numbing: lidocaine 1% to skin and subcutaneous tissues  Vertebral space: lumbar  Lumbar location: L3-L4  Epidural  Loss of resistance technique: saline  Guidance: landmark technique        Needle  Needle type: Tuohy   Needle gauge: 17  Needle length: 10.2 cm  Needle insertion depth: 6 cm  Catheter type: end hole  Catheter size: 19 G  Catheter at skin depth: 10 cm  Catheter securement method: clear occlusive dressing    Test dose: lidocaine 1.5% with epinephrine 1-to-200,000  Test dose: lidocaine 1.5% with epinephrine 1-to-200,000  Test dose result: no positive test dose    PCEA  Medication concentration used: 0.2% Ropivacaine with 2 mcg/mL Fentanyl  Dose (mL): 5  Lockout (minutes): 20  1-Hour Limit (boluses/hr): 3  Basal Rate: 8        Assessment  Block outcome: pain improved  Number of attempts: 1  Events: no positive test dose and negative heme/CSF/paresthesia  Procedure assessment: patient  tolerated procedure well with no immediate complications  Additional Notes  Labor pain 12/10 prior to epidural placement.    Epidural placed with first attempt, adjusting slight cephalad and then slightly caudad.  First approach with a false HORACE and unable to thread catheter (4.5cm), adjusted caudad and HORACE at 6cm.  Catheter threaded easily.

## 2024-03-20 LAB
ERYTHROCYTE [DISTWIDTH] IN BLOOD BY AUTOMATED COUNT: 15.7 % (ref 11.5–14.5)
HCT VFR BLD AUTO: 29.1 % (ref 36–46)
HGB BLD-MCNC: 9.5 G/DL (ref 12–16)
MCH RBC QN AUTO: 30.9 PG (ref 26–34)
MCHC RBC AUTO-ENTMCNC: 32.6 G/DL (ref 32–36)
MCV RBC AUTO: 95 FL (ref 80–100)
NRBC BLD-RTO: 0 /100 WBCS (ref 0–0)
PLATELET # BLD AUTO: 178 X10*3/UL (ref 150–450)
RBC # BLD AUTO: 3.07 X10*6/UL (ref 4–5.2)
WBC # BLD AUTO: 20.4 X10*3/UL (ref 4.4–11.3)

## 2024-03-20 PROCEDURE — 1120000001 HC OB PRIVATE ROOM DAILY

## 2024-03-20 PROCEDURE — 2500000001 HC RX 250 WO HCPCS SELF ADMINISTERED DRUGS (ALT 637 FOR MEDICARE OP): Performed by: STUDENT IN AN ORGANIZED HEALTH CARE EDUCATION/TRAINING PROGRAM

## 2024-03-20 PROCEDURE — 36415 COLL VENOUS BLD VENIPUNCTURE: CPT | Performed by: STUDENT IN AN ORGANIZED HEALTH CARE EDUCATION/TRAINING PROGRAM

## 2024-03-20 PROCEDURE — 85027 COMPLETE CBC AUTOMATED: CPT | Performed by: STUDENT IN AN ORGANIZED HEALTH CARE EDUCATION/TRAINING PROGRAM

## 2024-03-20 PROCEDURE — 2500000004 HC RX 250 GENERAL PHARMACY W/ HCPCS (ALT 636 FOR OP/ED): Performed by: STUDENT IN AN ORGANIZED HEALTH CARE EDUCATION/TRAINING PROGRAM

## 2024-03-20 RX ORDER — HYDROMORPHONE HYDROCHLORIDE 1 MG/ML
0.2 INJECTION, SOLUTION INTRAMUSCULAR; INTRAVENOUS; SUBCUTANEOUS EVERY 5 MIN PRN
Status: DISCONTINUED | OUTPATIENT
Start: 2024-03-20 | End: 2024-03-20 | Stop reason: HOSPADM

## 2024-03-20 RX ADMIN — KETOROLAC TROMETHAMINE 30 MG: 30 INJECTION, SOLUTION INTRAMUSCULAR; INTRAVENOUS at 14:31

## 2024-03-20 RX ADMIN — ENOXAPARIN SODIUM 40 MG: 40 INJECTION SUBCUTANEOUS at 14:35

## 2024-03-20 RX ADMIN — KETOROLAC TROMETHAMINE 30 MG: 30 INJECTION, SOLUTION INTRAMUSCULAR; INTRAVENOUS at 07:52

## 2024-03-20 RX ADMIN — ACETAMINOPHEN 975 MG: 325 TABLET ORAL at 07:52

## 2024-03-20 RX ADMIN — ACETAMINOPHEN 975 MG: 325 TABLET ORAL at 19:41

## 2024-03-20 RX ADMIN — KETOROLAC TROMETHAMINE 30 MG: 30 INJECTION, SOLUTION INTRAMUSCULAR; INTRAVENOUS at 02:37

## 2024-03-20 RX ADMIN — ACETAMINOPHEN 975 MG: 325 TABLET ORAL at 02:37

## 2024-03-20 RX ADMIN — IBUPROFEN 600 MG: 600 TABLET, FILM COATED ORAL at 19:41

## 2024-03-20 RX ADMIN — ACETAMINOPHEN 975 MG: 325 TABLET ORAL at 14:31

## 2024-03-20 ASSESSMENT — PAIN SCALES - GENERAL
PAINLEVEL_OUTOF10: 5 - MODERATE PAIN
PAINLEVEL_OUTOF10: 0 - NO PAIN
PAINLEVEL_OUTOF10: 3
PAIN_LEVEL: 1
PAINLEVEL_OUTOF10: 3
PAINLEVEL_OUTOF10: 0 - NO PAIN
PAINLEVEL_OUTOF10: 3
PAIN_LEVEL: 3
PAINLEVEL_OUTOF10: 0 - NO PAIN

## 2024-03-20 ASSESSMENT — PAIN DESCRIPTION - LOCATION
LOCATION: ABDOMEN
LOCATION: ABDOMEN

## 2024-03-20 ASSESSMENT — PAIN - FUNCTIONAL ASSESSMENT: PAIN_FUNCTIONAL_ASSESSMENT: 0-10

## 2024-03-20 NOTE — LACTATION NOTE
Lactation Consultant Note  Lactation Consultation          Recommendations/Summary  RN in room earlier in shift to offer assistance with latch. Parents felt NB not latching well. Parents requested DHM via bottle. Parents state they would luke to pump. RN out to secure pumping supplies. Parents request they start pumping later and they would like to rest at this time. Parents understand supply and demand. FOB medical resident.

## 2024-03-20 NOTE — L&D DELIVERY NOTE
OB Delivery Note  3/20/2024  Noemy Ceja  33 y.o.   , Low Transverse      Gestational Age: 39w1d  /Para:   Quantitative Blood Loss: EBL 800cc    Scarlett Ceja [[id number]]      Labor Events    Sac identifier: Sac 1  Rupture date/time: 3/19/2024 1122  Rupture type: Artificial  Fluid color: Meconium  Fluid odor: None  Labor type: Spontaneous Onset of Labor  Labor allowed to proceed with plans for an attempted vaginal birth?: Yes  Augmentation: AROM  Augmentation indications: Hypertension  Complications: None       Labor Event Times    Labor onset date/time: 3/19/2024 0412  Dilation complete date/time: 3/19/2024 1459       Labor Length    1st stage: 10h 47m  2nd stage: 4h 45m  3rd stage: 0h 01m       Placenta    Placenta delivery date/time: 3/19/2024 1945  Placenta removal: Expressed  Placenta appearance: Intact  Placenta disposition: pathology       Cord    Vessels: 3 vessels  Complications: None  Delayed cord clamping?: Yes  Cord blood disposition: Lab  Gases sent?: No  Stem cell collection (by provider): No       Lacerations    Episiotomy: None  Perineal laceration: None  Other lacerations?: No  Repair suture: None       Anesthesia    Method: Epidural       Operative Delivery    Forceps attempted?: No  Vacuum extractor attempted?: No       Shoulder Dystocia    Shoulder dystocia present?: No       [place] Delivery    Birth date/time: 3/19/2024 19:44:00  Delivery type: , Low Transverse   categorization: primary   priority: routine  Indications for : Arrest of Descent  Incision type: low transverse  Complications: None       Resuscitation    Method: Tactile stimulation       Apgars    Living status: Living  Apgar Component Scores:  1 min.:  5 min.:  10 min.:  15 min.:  20 min.:    Skin color:  1  1       Heart rate:  2  2       Reflex irritability:  2  2       Muscle tone:  2  2       Respiratory effort:  2  2       Total:  9  9       Apgars assigned by:  [facility]       Delivery Providers    Delivering clinician: Lisbeth Bush MD   Provider Role    Haylie Youssef, RN Delivery Nurse    Juan Blankenship, RN Nursery Nurse     Resident               Diagnosis  IUP @ 39.1wga  Arrest of descent    Indication: 34 y/o G1 who presented in spontaneous labor @ 39.1wga. Was augmented with AROM and progressed to complete. Initially pushed unmedicated with initial descent of fetal vertex from 0 to +1. Then became fatigued and opted for epidural after 2.5 hours of second stage. After epidural placement, was found to be +2 with significant caput. Resumed pushing with minimal descent of fetal vertex. At this time, was 3.5 hours into second stage and was offered operative vaginal delivery vs  delivery. After counseling, elected for  delivery for arrest of descent.     Findings: Normal appearing gravid uterus, fallopian tubes, and ovaries. Adhesion between left lateral bladder and peritoneum. Amniotic fluid meconium-stained, male infant in ARPIT presentation. Weight 3310g, APGARs 9 and 9 at 1 and 5 minutes of life respectively.    Procedure: Pt was taken to the OR where epidural anesthesia was administered. She was then placed in the dorsal supine position with a left lateral tilt. A bedolla catheter was already in place. SCD’s were applied and a vaginal prep was performed. A fetal pillow was placed and inflated with 180cc saline. A pre-procedure time out was performed. The pt was given a prophylactic dose of IV antibiotics. She was then prepped and draped in the usual sterile fashion. A Pfannenstiel skin incision was made with the scalpel through the skin and subcutaneous fat to the underlying fascial layer. The fascia was incised in the midline with the scalpel and the incision was extended bilaterally. The superior aspect of the incision was grasped, tented up with Kocher clamps and the rectus muscle was dissected off. The muscles were divided in the midline. The peritoneum  was then identified and entered bluntly. The incision was extended superiorly and inferiorly taking care to avoid underlying viscera. The bladder blade was inserted. Left corner of bladder found to be adherent to peritoneum. Peritoneum was taken down with electrocautery well away from this area of adherent bladder.     A low transverse uterine incision was made with the scalpel, the uterine cavity was entered, and the hysterotomy was extended bilaterally with cephalocaudal stretch. The infant was delivered atraumatically, the cord was clamped and cut, and infant was handed off to awaiting nursing. A cord blood sample was collected. The placenta was then expressed. The uterus was exteriorized and cleared of all clot and debris. The uterine incision was repaired using a running locked stitch of 0-Vicryl. A second layer of the same suture was placed in an imbricating fashion. Good hemostasis was noted.    The uterus was the placed back inside the pelvis and the gutters were cleared of all clots and debris. The hysterotomy was again evaluated and found to be hemostatic. The bladder was then backfilled with sterile formula and found to be intact/ The underside of the fascia, the bladder, and the rectus muscles were also found to be hemostatic. The fascia was closed using a running stitch of 0-PDS. The subcutaneous layer was irrigated, small bleeders were cauterized, and the subcutaneous layer was reapproximated using 2-0 Monocryl. The skin was closed with 4-0 Monocryl. All counts were correct and the patient tolerated the procedure well. The patient and infant were taken back to the recovery room in stable condition. Dr. Colon assisted in closure of fascia.     Lisbeth Bush MD

## 2024-03-20 NOTE — ANESTHESIA POSTPROCEDURE EVALUATION
"Patient: Noemy Ceja    Procedure Summary       Date: 24 Room / Location: [facility] [place] [facility] OB    Anesthesia Start:  Anesthesia Stop:     Procedure: OBGYN Delivery  Section Diagnosis: (Arrest of Descent)    Surgeons: Lisbeth Bush MD Responsible Provider: GEORGE Hensley    Anesthesia Type: epidural ASA Status: 2            Anesthesia Type: epidural     39w1d 33 y.o.     /66   Pulse 87   Temp 37.1 °C (98.7 °F) (Oral)   Resp 18   Ht 1.588 m (5' 2.5\")   Wt 71.6 kg (157 lb 15.4 oz)   LMP 2023   SpO2 96%   Breastfeeding Unknown   BMI 28.43 kg/m²         Anesthesia Post Evaluation    Patient location during evaluation: bedside  Patient participation: complete - patient participated  Level of consciousness: awake and alert  Pain score: 3  Pain management: adequate  Multimodal analgesia pain management approach  Airway patency: patent  Cardiovascular status: acceptable  Respiratory status: acceptable  Hydration status: acceptable  Postoperative Nausea and Vomiting: none        No notable events documented.    "

## 2024-03-20 NOTE — ANESTHESIA POSTPROCEDURE EVALUATION
Patient: Noemy Ceja    Procedure Summary       Date: 24 Room / Location: [facility] [place] [facility] OB    Anesthesia Start:  Anesthesia Stop:     Procedure: OBGYN Delivery  Section (Abdomen) Diagnosis: (Arrest of Descent)    Surgeons: Lisbeth Bush MD Responsible Provider: GEORGE Hensley    Anesthesia Type: epidural ASA Status: 2            Anesthesia Type: epidural    Vitals Value Taken Time   /61 24   Temp 36.9 °C (98.4 °F) 24   Pulse 92 24   Resp 16 24   SpO2 96 % 24       Anesthesia Post Evaluation    Patient location during evaluation: bedside  Patient participation: complete - patient participated  Level of consciousness: awake and alert  Pain score: 1  Pain management: adequate  Multimodal analgesia pain management approach  Airway patency: patent  Cardiovascular status: acceptable  Respiratory status: acceptable  Hydration status: acceptable  Postoperative Nausea and Vomiting: none  Comments: Epidural catheter removed by nursing. No redness, swelling, or drainage at puncture site.    Complete resolution of numbness. Patient is able to lift legs, bend at the knees, and ambulate.    Patient denies problems with urination.    Patient denies headache or severe back pain.     There were no known notable events for this encounter.

## 2024-03-20 NOTE — PROGRESS NOTES
Spiritual Care Visit    Clinical Encounter Type  Visited With: Patient and family together  Routine Visit: Introduction  Continue Visiting: No                                            Taxonomy  Intended Effects: Preserve dignity and respect, Jayshree affirmation, Helping someone feel comforted, Promote sense of peace  Methods: Offer spiritual/Catholic support  Interventions: [place], Share words of hope and inspiration    Patient is a very strong Pentecostalism and has a house Confucianism she is a part of with her .   congratulated her on her first baby and prayed at her request.  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

## 2024-03-20 NOTE — LACTATION NOTE
Lactation Consultant Note  Lactation Consultation  Reason for Consult: Initial assessment  Consultant Name: Angela Figueroa    Maternal Information  Has mother  before?: No  Exclusive Pump and Bottle Feed: No    Maternal Assessment  Breast Assessment: Small, Symmetrical, Compressible  Nipple Assessment: Intact, Short, Erect with stimulation, Sore  Alterations in Nipple Condition: Stage I - pain or irritation with no skin break down  Areola Assessment: Normal    Infant Assessment  Infant Behavior: Awake, Rooting response, Suckles on and off, needs stimulation, Fussy  Infant Assessment: Sublingual frenulum (comment), Tongue protrudes over alveolar ridge (Frenulum between midline and apex. Limited elevation)    Feeding Assessment  Nutrition Source: Breastmilk, Donor human milk  Feeding Method: Nursing at the breast, Feeding expressed breastmilk, Syringe feeding  Feeding Position: Cradle, Skin to skin, Mother needs assistance with latch/positioning  Suck/Feeding: Disorganized suck, Unsustained, Nipple shield used  Latch Assessment: Maximum assistance is needed, No latch achieved    LATCH TOOL  Latch: Too sleepy or reluctant, no latch achieved  Audible Swallowing: None  Type of Nipple: Everted (After stimulation)  Comfort (Breast/Nipple): Filling, red/small blisters/bruises, mild/moderate discomfort  Hold (Positioning): Minimal assist, teach one side, mother does other, staff holds  LATCH Score: 4    Breast Pump  Pump: Hospital grade electric pump  Frequency: Less than 3 times per day  Duration: Initiate phase  Breast Shield Size and Type: 21 mm  Units of Volume: Drops    Other OB Lactation Tools  Lactation Tools: Nipple shields    Patient Follow-up  Inpatient Lactation Follow-up Needed : Yes  Outpatient Lactation Follow-up: Recommended    Other OB Lactation Documentation  Maternal Risk Factors: Age over 30, primiparity,  delivery    Recommendations/Summary  , 39.1 weeks, Pc/s on 3/19@1944. Birthweight  3310g. TCB 4.9@20 hours. Mother exhausted overnight, giving DHM (no medical reason indicated). Reports difficulty latching. Infant puts mouth around breast, will not suck.    First consult, 1300: Infant at breast, mother attempting to latch. Taught ABC's of good positioning: arms around breast, infant belly to belly with parent, infant's curve of hip to parent's curve of body. Taught to have infant rest their chin on the breast below the areola. Parents instructed to wait for gape reflex elicited by chin pressure on the breast, before hugging infant close to facilitate latching. Parents demonstrate require assistance from IBCLC to time latching. Infant puts mouth around breast, thrusting, no sucking. Shield used, sucks 1-2 times and then thrusts. Small drops of colostrum expressed with HE. Oral exam done. Lingual frenulum attaches between midline an apex, tight, thin. Tongue extends past alveolar ridge, elevates FDC to roof of mouth. Biting/clenching noted with sucking, snap back noted. Improves with suck training.  Discussed POC with parents. Most concerned about infant being fed and stimulating breasts to make milk. Requesting DHM at this time, will pump for stimulation.  Given 5mL DHM via syringe. Mother encouraged to pump. Mother reports too tired, fell asleep while LC giving DHM.     Second consult, 1400: LC called to bedside for feeding. Mother reports pumping and expressing drops. Infant in cradle hold, mother attempting to latch, RN and parents report infant still putting mouth around breast, not sucking. Given 2mL DHM via syringe. Suck training done, strong suck noted with good cupping. Some thrusting/biting noted. Reinforced ABC's of good positioning and deep latching. Assisted with latching in cradle hold. LC latches, mother takes over holding, LC supports breast. Infant latched to nipple shield with DHM in shield initially. Wide gape, flanged lips, burst of sucking, swallowing, and rest. Nipple  shield removed. LC able to latch infant to bare breast. Sustained bursts of sucking and swallowing for >5min. Infant fell asleep at breast, content. Rooting when placed up on mother's chest. Offered other breast without nipple shield. Infant able to latch with LC assistance, wide gape, flanged lips, bursts of sucking, swallowing, and rest. Mother reports mild discomfort/pinching with sucking. Nipples slightly creased after feed.    Educated parents on skin to skin, hand expression, hunger cues and feeding frequency/patterns. Discussed expected output, weight loss <10%, and normal bilirubin. Educated on AAP pacifier recommendations. Reviewed outpatient resources.    Plan:  Cue based feeding, at least 8-12 times in 24 hours  Frequent skin to skin  Start feed with 5mL DHM or EBM if infant unwilling to latch  Pump after feeds if giving DHM  Call for assistance as needed

## 2024-03-20 NOTE — PROGRESS NOTES
OB Postpartum Progress Note    POD #1 s/p pLTCS for arrest of descent    S: Overall feeling well.  Pain well controlled with PO meds.  Voiding freely and passing flatus.  Breastfeeding.  Lochia decreasing.    O:  Vitals:    03/20/24 1544   BP: 102/59   Pulse: 88   Resp:    Temp: 36.7 °C (98.1 °F)   SpO2: (!) 94%       OBGyn Exam  Gen in NAD  Abdomen soft, appropriately tender, incision c/d/I with bandage in place, no rebound or guarding  Ext no LE edema    Results for orders placed or performed during the hospital encounter of 03/19/24 (from the past 24 hour(s))   Prepare RBC: 1 Units, CMV Seronegative   Result Value Ref Range    PRODUCT CODE L3899X81     Unit Number [id number]     Unit ABO A     Unit RH POS     XM INTEP COMP     Dispense Status XM     Blood Expiration Date April 02, 2024 23:59 EDT     PRODUCT BLOOD TYPE 6200     UNIT VOLUME 350    VERIFY ABO/Rh Group Test   Result Value Ref Range    ABO TYPE A     Rh TYPE POS    Comprehensive Metabolic Panel   Result Value Ref Range    Glucose 106 (H) 74 - 99 mg/dL    Sodium 133 (L) 136 - 145 mmol/L    Potassium 3.9 3.5 - 5.3 mmol/L    Chloride 101 98 - 107 mmol/L    Bicarbonate 19 (L) 21 - 32 mmol/L    Anion Gap 17 10 - 20 mmol/L    Urea Nitrogen 14 6 - 23 mg/dL    Creatinine 0.88 0.50 - 1.05 mg/dL    eGFR 90 >60 mL/min/1.73m*2    Calcium 9.0 8.6 - 10.3 mg/dL    Albumin 3.4 3.4 - 5.0 g/dL    Alkaline Phosphatase 144 (H) 33 - 110 U/L    Total Protein 6.1 (L) 6.4 - 8.2 g/dL    AST 17 9 - 39 U/L    Bilirubin, Total 0.4 0.0 - 1.2 mg/dL    ALT 10 7 - 45 U/L   CBC   Result Value Ref Range    WBC 21.4 (H) 4.4 - 11.3 x10*3/uL    nRBC 0.0 0.0 - 0.0 /100 WBCs    RBC 4.02 4.00 - 5.20 x10*6/uL    Hemoglobin 12.5 12.0 - 16.0 g/dL    Hematocrit 37.3 36.0 - 46.0 %    MCV 93 80 - 100 fL    MCH 31.1 26.0 - 34.0 pg    MCHC 33.5 32.0 - 36.0 g/dL    RDW 15.2 (H) 11.5 - 14.5 %    Platelets 210 150 - 450 x10*3/uL   Coagulation Screen   Result Value Ref Range    Protime 10.3 9.8 -  12.8 seconds    INR 0.9 0.9 - 1.1    aPTT 25 (L) 27 - 38 seconds   Uric Acid   Result Value Ref Range    Uric Acid 5.5 2.3 - 6.7 mg/dL   Lactate Dehydrogenase   Result Value Ref Range     84 - 246 U/L   Fibrinogen   Result Value Ref Range    Fibrinogen 466 (H) 200 - 400 mg/dL   CBC   Result Value Ref Range    WBC 20.4 (H) 4.4 - 11.3 x10*3/uL    nRBC 0.0 0.0 - 0.0 /100 WBCs    RBC 3.07 (L) 4.00 - 5.20 x10*6/uL    Hemoglobin 9.5 (L) 12.0 - 16.0 g/dL    Hematocrit 29.1 (L) 36.0 - 46.0 %    MCV 95 80 - 100 fL    MCH 30.9 26.0 - 34.0 pg    MCHC 32.6 32.0 - 36.0 g/dL    RDW 15.7 (H) 11.5 - 14.5 %    Platelets 178 150 - 450 x10*3/uL       Assessment and Plan  33 y.o. y/o  POD#1 s/p pLTCS, overall doing well.  Routine postpartum  -A+/rubella immune  -breastfeeding  -male infant, plan for peds urology for circ  -multiple mild range bps over a 3.5 hour time period, severes charged as erroneous, normal HELLP labs, will continue to watch bps but at this time does not meet criteria for a HDP  2. Routine postop  -PO pain meds  -hgb 12.5/9.5, appropriate for qbl  -ADAT, IS, ambulate  -lovenox for dvt ppx  3. Dispo  -continue inpatient care    Nataliya Mojica MD

## 2024-03-21 ENCOUNTER — APPOINTMENT (OUTPATIENT)
Dept: OBSTETRICS AND GYNECOLOGY | Facility: CLINIC | Age: 34
End: 2024-03-21
Payer: COMMERCIAL

## 2024-03-21 PROCEDURE — 2500000004 HC RX 250 GENERAL PHARMACY W/ HCPCS (ALT 636 FOR OP/ED): Performed by: STUDENT IN AN ORGANIZED HEALTH CARE EDUCATION/TRAINING PROGRAM

## 2024-03-21 PROCEDURE — 1120000001 HC OB PRIVATE ROOM DAILY

## 2024-03-21 PROCEDURE — 2500000001 HC RX 250 WO HCPCS SELF ADMINISTERED DRUGS (ALT 637 FOR MEDICARE OP): Performed by: STUDENT IN AN ORGANIZED HEALTH CARE EDUCATION/TRAINING PROGRAM

## 2024-03-21 RX ADMIN — ACETAMINOPHEN 975 MG: 325 TABLET ORAL at 09:18

## 2024-03-21 RX ADMIN — ACETAMINOPHEN 975 MG: 325 TABLET ORAL at 03:36

## 2024-03-21 RX ADMIN — IBUPROFEN 600 MG: 600 TABLET, FILM COATED ORAL at 14:48

## 2024-03-21 RX ADMIN — IBUPROFEN 600 MG: 600 TABLET, FILM COATED ORAL at 03:36

## 2024-03-21 RX ADMIN — IBUPROFEN 600 MG: 600 TABLET, FILM COATED ORAL at 20:30

## 2024-03-21 RX ADMIN — IBUPROFEN 600 MG: 600 TABLET, FILM COATED ORAL at 09:18

## 2024-03-21 RX ADMIN — ACETAMINOPHEN 975 MG: 325 TABLET ORAL at 14:49

## 2024-03-21 RX ADMIN — ENOXAPARIN SODIUM 40 MG: 40 INJECTION SUBCUTANEOUS at 14:49

## 2024-03-21 RX ADMIN — ACETAMINOPHEN 975 MG: 325 TABLET ORAL at 20:30

## 2024-03-21 ASSESSMENT — PAIN DESCRIPTION - DESCRIPTORS
DESCRIPTORS: CRAMPING
DESCRIPTORS: SORE
DESCRIPTORS: CRAMPING

## 2024-03-21 ASSESSMENT — PAIN SCALES - GENERAL
PAINLEVEL_OUTOF10: 2
PAINLEVEL_OUTOF10: 1
PAINLEVEL_OUTOF10: 2
PAINLEVEL_OUTOF10: 1
PAINLEVEL_OUTOF10: 0 - NO PAIN
PAINLEVEL_OUTOF10: 2

## 2024-03-21 ASSESSMENT — PAIN - FUNCTIONAL ASSESSMENT: PAIN_FUNCTIONAL_ASSESSMENT: 0-10

## 2024-03-21 NOTE — CARE PLAN
Problem: Vaginal Birth or  Section  Goal: Fetal and maternal status remain reassuring during the birth process  Outcome: Progressing  Goal: Demonstrates labor coping techniques through delivery  Outcome: Progressing  Goal: Minimal s/sx of HDP and BP<160/110  Outcome: Progressing     Problem: Safety - Adult  Goal: Free from fall injury  Outcome: Progressing   The patient's goals for the shift include Rest, Help Breastfeeding    The clinical goals for the shift include Stable Vital Signs and Bonding with Baby    Patient has had stable vital signs and assessments, pain well controlled, and bleeding has been appropriate this shift.

## 2024-03-21 NOTE — PROGRESS NOTES
Postpartum Progress Note    Assessment/Plan   Noemy Ceja is a 33 y.o.,  who delivered at 39w1d gestation via  section due to arrest of descent in stage II and is now postpartum day 2.    - Continue routine postpartum care  - Breast and bottle feeding. Trouble latching. Suckles on/off, needs stimulation. LATCH Score =4.   - Male infant.  Doing well.   -Mom Rh + (Type A)  -Rubella Immune    #Blood Loss following   - Total Blood loss ~ 1020 mL (800 mL from )  -H.5, acute blood loss anemia. Send home with Iron PO post discharge.   -Pt experienced light-headedness yesterday despite no ambulation. Has since resolved  -denies dizziness, headache, current light-headedness, blurry vision, heart palpitations, nausea, or vomiting.  -Monitor for symptoms of PPH.  -Replace fluids or blood loss as needed.      - Discharge home on POD 2-3 pending post-operative milestones  - Follow up in 1-2 weeks for incision check     CARLTON PRUITT  Medical Student (MS-3)  [employer]     Lab Results   Component Value Date    LABRH POS 2024     Lab Results   Component Value Date    RUBIG POSITIVE 2023       Principal Problem:    Normal labor    Pregnancy Problems (from 23 to present)       Problem Noted Resolved    Prenatal care 2023 by CHANDNI Lin No    Overview Signed 2023 11:26 AM by CHANDNI Lin     Declined flu shot               Hospital course: no complications    Subjective   Pt doing well after delivery.  Moderate lochia.  Patient has not ambulated, yet experienced light-headedness yesterday. She has not experienced this since then. Denies nausea, vomiting, dizziness, headache, blurry vision, or heart palpitations. Is using ice packs to manage swelling.  She is passing flatus and voiding without issue. Her pain is well controlled overall.  She is not taking narcotic pain medications. Trouble latching, but  "breastfeeding going well overall.     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   -Per , Hbg is ~11 at baseline.    -Denies any other acute concerns.    Objective   Allergies:   Patient has no known allergies.         Last Vitals:  /58   Pulse 84   Temp 36.7 °C (98.1 °F) (Oral)   Resp 16   Ht 1.588 m (5' 2.5\")   Wt 71.6 kg (157 lb 15.4 oz)   LMP 06/19/2023   SpO2 (!) 95%   Breastfeeding Yes   BMI 28.43 kg/m²     Physical Exam:  General: Examination reveals a well developed, well nourished, female, in no acute distress. She is alert and cooperative.  HEENT: PERRLA.  Lungs: Respirations unlabored  Abdomen: Soft, nondistended, appropriately tender to palpation for post-operative course, no rebound or guarding   Incision:  Dressing intact. Small region of blood on medial left side of bandage.  Extremities: no redness or tenderness in the calves or thighs, mild lower extremity edema.  Psychological: awake and alert; oriented to person, place, and time    Lab Data:  Lab Results   Component Value Date    HGB 9.5 (L) 03/20/2024    HGB 12.5 03/19/2024          "

## 2024-03-22 VITALS
HEIGHT: 63 IN | SYSTOLIC BLOOD PRESSURE: 103 MMHG | TEMPERATURE: 97.9 F | BODY MASS INDEX: 27.99 KG/M2 | OXYGEN SATURATION: 97 % | RESPIRATION RATE: 18 BRPM | WEIGHT: 157.96 LBS | DIASTOLIC BLOOD PRESSURE: 66 MMHG | HEART RATE: 81 BPM

## 2024-03-22 LAB
LABORATORY COMMENT REPORT: NORMAL
PATH REPORT.FINAL DX SPEC: NORMAL
PATH REPORT.GROSS SPEC: NORMAL
PATH REPORT.RELEVANT HX SPEC: NORMAL
PATH REPORT.TOTAL CANCER: NORMAL

## 2024-03-22 PROCEDURE — 2500000001 HC RX 250 WO HCPCS SELF ADMINISTERED DRUGS (ALT 637 FOR MEDICARE OP): Performed by: STUDENT IN AN ORGANIZED HEALTH CARE EDUCATION/TRAINING PROGRAM

## 2024-03-22 RX ORDER — ACETAMINOPHEN 500 MG
500 TABLET ORAL EVERY 6 HOURS PRN
Refills: 0 | COMMUNITY
Start: 2024-03-22

## 2024-03-22 RX ADMIN — ACETAMINOPHEN 975 MG: 325 TABLET ORAL at 09:39

## 2024-03-22 RX ADMIN — IBUPROFEN 600 MG: 600 TABLET, FILM COATED ORAL at 09:39

## 2024-03-22 ASSESSMENT — PAIN SCALES - GENERAL: PAINLEVEL_OUTOF10: 0 - NO PAIN

## 2024-03-22 NOTE — DISCHARGE SUMMARY
"OB Discharge Summary    Admission Date: 3/19/2024  Discharge Date: 3/22/2024     Discharge Diagnosis  Problem List: Full term PLTCS failure to descend / baby boy     Hospital Course  Delivery Date: 3/19/2024  7:44 PM   Delivery type: , Low Transverse    GA at delivery: 39w1d  Outcome: Living   Anesthesia during delivery: Epidural   Intrapartum complications: None   Feeding method: Breastfeeding Status: Yes         Last Vitals:  Blood pressure 103/66, pulse 81, temperature 36.6 °C (97.9 °F), temperature source Oral, resp. rate 18, height 1.588 m (5' 2.5\"), weight 71.6 kg (157 lb 15.4 oz), last menstrual period 2023, SpO2 97 %, currently breastfeeding.     Pertinent Physical Exam At Time of Discharge  General: Examination reveals a well developed, well nourished, female, in no acute distress. She is alert and cooperative.  Abdomen: non-tender, fundus below umbilicus   Incision: healing well.  Extremities: no redness or tenderness in the calves or thighs, no edema.  Psychological: mood normal    Lab Results   Component Value Date    HGB 9.5 (L) 2024    HCT 29.1 (L) 2024       Discharge Meds     Your medication list        START taking these medications        Instructions Last Dose Given Next Dose Due   acetaminophen 500 mg tablet  Commonly known as: Tylenol Extra Strength      Take 1 tablet (500 mg) by mouth every 6 hours if needed for mild pain (1 - 3).              CONTINUE taking these medications        Instructions Last Dose Given Next Dose Due   OMEGA-3 FISH OIL ORAL           Prenatal Vitamin 27 mg iron-800 mcg folic acid tablet  Generic drug: prenatal vitamin (iron-folic)                     Where to Get Your Medications        You can get these medications from any pharmacy    You don't need a prescription for these medications  acetaminophen 500 mg tablet          Complications Requiring Follow-Up  None    Test Results Pending At Discharge  Pending Labs       Order Current Status "    Surgical Pathology Exam - PLACENTA In process            Outpatient Follow-Up  2 weeks incision check       Lizeth Oro MD

## 2024-03-22 NOTE — PROGRESS NOTES
"At bedside 15 min ago. Pt waiting for husb to come in.  So I will go over discharge plans later.    Objective       Recent Vital Signs:                                 /66   Pulse 81   Temp 36.6 °C (97.9 °F) (Oral)   Resp 18   Ht 1.588 m (5' 2.5\")   Wt 71.6 kg (157 lb 15.4 oz)   LMP 06/19/2023   SpO2 97%   Breastfeeding Yes   BMI 28.43 kg/m²       Lab Data:  Lab Results   Component Value Date    WBC 20.4 (H) 03/20/2024    HGB 9.5 (L) 03/20/2024    HCT 29.1 (L) 03/20/2024    MCV 95 03/20/2024     03/20/2024       Assessment/Plan   She is doing well postoperatively. Ambulating, voiding and tolerating a regular diet. Bowel habits are normal. Minimal pain. Doing well. No complaints.       Lizeth Oro MD    "

## 2024-03-22 NOTE — NURSING NOTE
1620  This RN spent 1+ hr educating patient and  on mom and baby.  Topics included but not limited too:  For mom--Post birth warning signs, PP bleeding, not inserting things vaginally while PP, pumping frequency, pumping common problems, C/s Care, C/S activity, driving after surgery  For baby-- diaper counts, wet diaper minimum, baby needing to eat 8 times (2-3qH), what stool may look like, symptoms and signs of a sick baby, car seats, cord care, baths, signs of hunger and signs of being full.    1700  Patient leaves unit via wheelchair escorted by this RN. Baby in carseat

## 2024-03-22 NOTE — DISCHARGE INSTR - AVS FIRST PAGE
Post birth warning signs.    Call 9-1-1 for:  Pain in chest  Difficulty breathing,  Seizures,  Thoughts of hurting yourself or anyone else.    Call your doctor for other things such as:  -headaches that don't go away  -vision changes  -sharp belly pain  -increased bleeding or clots that are as big as a gold ball  -calf pain/tenderness that's one sided  -fever  -signs of infection like odor or colored discharge

## 2024-03-22 NOTE — LACTATION NOTE
Lactation Consultant Note  Lactation Consultation  Reason for Consult: Follow-up assessment  Consultant Name: Angela Figueroa    Maternal Information  Has mother  before?: No  Exclusive Pump and Bottle Feed: Yes (Switched to EP for discharge plan)    Maternal Assessment  Breast Assessment: Small, Symmetrical, Filling, Compressible  Nipple Assessment: Intact, Short, Erect with stimulation, Sore  Alterations in Nipple Condition: Stage I - pain or irritation with no skin break down  Areola Assessment: Normal    Infant Assessment  Infant Behavior: Awake, Rooting response, Sucking  Infant Assessment: Tongue protrudes over alveolar ridge, Sublingual frenulum (comment) (Frenulum between midline and apex. Tight. Limited elevation)    Feeding Assessment  Nutrition Source: Breastmilk, Donor human milk  Feeding Method: Nursing at the breast, Feeding expressed breastmilk, Paced bottle, Syringe feeding  Feeding Position: Cross - cradle, Skin to skin, Breast sandwich, Mother needs assistance with latch/positioning  Suck/Feeding: Sustained, Baby led rhythmically, Audible swallowing  Latch Assessment: Maximum assistance is needed, Latch achieved, Wide open mouth < 160, Bursts of sucking, swallowing, and rest, Frequent audible swallows    LATCH TOOL  Latch: Grasps breast, tongue down, lips flanged, rhythmic sucking  Audible Swallowing: Spontaneous and intermittent (24 hours old)  Type of Nipple: Everted (After stimulation)  Comfort (Breast/Nipple): Filling, red/small blisters/bruises, mild/moderate discomfort  Hold (Positioning): Full assist, staff holds infant at breast  LATCH Score: 7    Breast Pump  Pump: Hospital grade electric pump  Frequency: Less than 3 times per day (Mother reports she has pumped 4 times since delivery)  Duration: Less than 15 minutes per session  Breast Shield Size and Type: 21 mm  Volume of Milk Production: 25  Units of Volume: mL per session    Other OB Lactation Tools  Lactation Tools: Nipple  shields    Patient Follow-up  Inpatient Lactation Follow-up Needed : No  Outpatient Lactation Follow-up: Recommended  Lactation Professional - OK to Discharge: Yes    Other OB Lactation Documentation  Maternal Risk Factors: Age over 30, primiparity,  delivery, Extreme tiredness, fatigue or stress    Recommendations/Summary  See previous note for history. 7.64% weight loss. TCB 10.1@55 hours. Mother reports 2-3 successful latches in the past 24 hours. Mostly giving DHM via syringe, has used paced bottle feeding x2. Reports pumping x4 since delivery. Expressed 25mL during last pumping session. Stated goal is giving exclusive breast milk. Uncertain if she wants to exclusively pump or latch and pump. Infant awake and cuing. Assisted with positioning in cross-cradle hole. LC latched infant, wide gape, flanged lips, bursts of sucking, swallowing, and rest. Mother reports latch painful, 6/10, nipple creased. Worked on deeper latching, LC able to obtain a deeper latch, mother reports latch still uncomfortable, nipple rounded after feeding. Mother verbalizes desire to exclusively pump at this time. Supporting mother's POC, gave option of following up with outpatient LC in a few days if desiring to return to latching.    Educated on exclusive pumping, need to pump at least 8 times in 24 hours for 15min or until breasts are empty. Sample pumping schedule given.   Tongue tie and infant feeding amounts per day of life resource sheets given. Discussed following up outpatient for lingual frenulum assessment if desired.    Discharge teaching reviewed. Taught engorgement management. Reviewed s/s of plugged ducts and mastitis and treatment. Reviewed normal feeding patterns of the “4th trimester” and outpatient support.

## 2024-03-22 NOTE — DISCHARGE INSTR - OTHER ORDERS
Nothing vaginally for the next 6 weeks. This includes intercourse, douching, tampons, or inserting anything else. Let yourself heal.

## 2024-03-22 NOTE — CARE PLAN
The patient's goals for the shift include Rest, Help Breastfeeding    The clinical goals for the shift include Stable Vital Signs and Bonding with Baby    Over the shift, the patient did not make progress toward the following goals. Barriers to progression include being a first time parents. Recommendations to address these barriers include education

## 2024-03-22 NOTE — DISCHARGE INSTR - ACTIVITY
Refrain from heavy lifting for 6 weeks. You should only be carrying the weight of your baby at this time.    Refrain from driving for 2 weeks.

## 2024-03-23 LAB
BLOOD EXPIRATION DATE: NORMAL
DISPENSE STATUS: NORMAL
PRODUCT BLOOD TYPE: 6200
PRODUCT CODE: NORMAL
UNIT ABO: NORMAL
UNIT NUMBER: NORMAL
UNIT RH: NORMAL
UNIT VOLUME: 350
XM INTEP: NORMAL

## 2024-03-25 ENCOUNTER — APPOINTMENT (OUTPATIENT)
Dept: OBSTETRICS AND GYNECOLOGY | Facility: CLINIC | Age: 34
End: 2024-03-25
Payer: COMMERCIAL

## 2024-04-01 ENCOUNTER — POSTPARTUM VISIT (OUTPATIENT)
Dept: OBSTETRICS AND GYNECOLOGY | Facility: CLINIC | Age: 34
End: 2024-04-01
Payer: COMMERCIAL

## 2024-04-01 VITALS
DIASTOLIC BLOOD PRESSURE: 62 MMHG | WEIGHT: 143.5 LBS | HEIGHT: 63 IN | BODY MASS INDEX: 25.43 KG/M2 | SYSTOLIC BLOOD PRESSURE: 120 MMHG

## 2024-04-01 DIAGNOSIS — Z09 POSTOP CHECK: Primary | ICD-10-CM

## 2024-04-01 PROBLEM — Z34.90 PRENATAL CARE (HHS-HCC): Status: RESOLVED | Noted: 2023-11-27 | Resolved: 2024-04-01

## 2024-04-01 PROCEDURE — 0503F POSTPARTUM CARE VISIT: CPT | Performed by: OBSTETRICS & GYNECOLOGY

## 2024-04-01 ASSESSMENT — EDINBURGH POSTNATAL DEPRESSION SCALE (EPDS)
I HAVE LOOKED FORWARD WITH ENJOYMENT TO THINGS: AS MUCH AS I EVER DID
TOTAL SCORE: 1
I HAVE BEEN SO UNHAPPY THAT I HAVE HAD DIFFICULTY SLEEPING: NOT AT ALL
I HAVE BEEN SO UNHAPPY THAT I HAVE BEEN CRYING: NO, NEVER
I HAVE BEEN ABLE TO LAUGH AND SEE THE FUNNY SIDE OF THINGS: AS MUCH AS I ALWAYS COULD
THINGS HAVE BEEN GETTING ON TOP OF ME: NO, MOST OF THE TIME I HAVE COPED QUITE WELL
I HAVE FELT SCARED OR PANICKY FOR NO GOOD REASON: NO, NOT AT ALL
I HAVE BLAMED MYSELF UNNECESSARILY WHEN THINGS WENT WRONG: NO, NEVER
I HAVE BEEN ANXIOUS OR WORRIED FOR NO GOOD REASON: NO, NOT AT ALL
I HAVE FELT SAD OR MISERABLE: NO, NOT AT ALL
THE THOUGHT OF HARMING MYSELF HAS OCCURRED TO ME: NEVER

## 2024-04-01 NOTE — PROGRESS NOTES
"Subjective   Patient ID: Noemy Ceja is a 33 y.o. female who presents for Postpartum Care. Incision check for  done on 3/19/24 by Dr. Bush at [facility]. Baby boy, Luke, 7lbs 5oz. Last pap 23, normal HPV-  HPI   Pushed abt 3 hrs, failure to descend .  Doing well. No complaints.   Baby doing well.     Objective       Recent Vital Signs:                                 /62 (BP Location: Right arm, Patient Position: Sitting)   Ht 1.588 m (5' 2.5\")   Wt 65.1 kg (143 lb 8 oz)   LMP 2023   Breastfeeding Yes   BMI 25.83 kg/m²       Physical Exam:  General: Examination reveals a well developed, well nourished, female, in no acute distress. She is alert and cooperative.  Abdomen: non-tender, flat, soft with no masses or distension  Incision : healing well.    Extremities: no redness or tenderness in the calves or thighs, no edema.      Assessment/Plan   She is doing well postoperatively. Ambulating, voiding and tolerating a regular diet. Bowel habits are normal. Minimal pain and no Rx meds needed.    OK to return to work and full activity after 6 weeks    Keep office exam in 4 weeks     Postop check    MD Fatoumata Nunes, [facility] 24 11:31 AM   "

## 2024-05-01 ENCOUNTER — APPOINTMENT (OUTPATIENT)
Dept: OBSTETRICS AND GYNECOLOGY | Facility: CLINIC | Age: 34
End: 2024-05-01
Payer: COMMERCIAL